# Patient Record
Sex: FEMALE | Race: BLACK OR AFRICAN AMERICAN | NOT HISPANIC OR LATINO | URBAN - METROPOLITAN AREA
[De-identification: names, ages, dates, MRNs, and addresses within clinical notes are randomized per-mention and may not be internally consistent; named-entity substitution may affect disease eponyms.]

---

## 2024-03-26 ENCOUNTER — INPATIENT (INPATIENT)
Facility: HOSPITAL | Age: 64
LOS: 2 days | Discharge: HOME | End: 2024-03-29
Attending: ORAL & MAXILLOFACIAL SURGERY | Admitting: ORAL & MAXILLOFACIAL SURGERY
Payer: COMMERCIAL

## 2024-03-26 VITALS
SYSTOLIC BLOOD PRESSURE: 192 MMHG | WEIGHT: 227.08 LBS | HEART RATE: 112 BPM | TEMPERATURE: 100 F | DIASTOLIC BLOOD PRESSURE: 129 MMHG | RESPIRATION RATE: 16 BRPM

## 2024-03-26 DIAGNOSIS — K12.2 CELLULITIS AND ABSCESS OF MOUTH: ICD-10-CM

## 2024-03-26 LAB
ALBUMIN SERPL ELPH-MCNC: 4.6 G/DL — SIGNIFICANT CHANGE UP (ref 3.3–5)
ALP SERPL-CCNC: 82 U/L — SIGNIFICANT CHANGE UP (ref 40–120)
ALT FLD-CCNC: 17 U/L — SIGNIFICANT CHANGE UP (ref 4–33)
ANION GAP SERPL CALC-SCNC: 17 MMOL/L — HIGH (ref 7–14)
APTT BLD: 34.2 SEC — SIGNIFICANT CHANGE UP (ref 24.5–35.6)
AST SERPL-CCNC: 23 U/L — SIGNIFICANT CHANGE UP (ref 4–32)
BASE EXCESS BLDV CALC-SCNC: 2.4 MMOL/L — SIGNIFICANT CHANGE UP (ref -2–3)
BASOPHILS # BLD AUTO: 0.07 K/UL — SIGNIFICANT CHANGE UP (ref 0–0.2)
BASOPHILS NFR BLD AUTO: 0.3 % — SIGNIFICANT CHANGE UP (ref 0–2)
BILIRUB SERPL-MCNC: 1.8 MG/DL — HIGH (ref 0.2–1.2)
BLD GP AB SCN SERPL QL: NEGATIVE — SIGNIFICANT CHANGE UP
BLOOD GAS VENOUS COMPREHENSIVE RESULT: SIGNIFICANT CHANGE UP
BUN SERPL-MCNC: 12 MG/DL — SIGNIFICANT CHANGE UP (ref 7–23)
CALCIUM SERPL-MCNC: 10.3 MG/DL — SIGNIFICANT CHANGE UP (ref 8.4–10.5)
CHLORIDE BLDV-SCNC: 103 MMOL/L — SIGNIFICANT CHANGE UP (ref 96–108)
CHLORIDE SERPL-SCNC: 98 MMOL/L — SIGNIFICANT CHANGE UP (ref 98–107)
CO2 BLDV-SCNC: 27.6 MMOL/L — HIGH (ref 22–26)
CO2 SERPL-SCNC: 23 MMOL/L — SIGNIFICANT CHANGE UP (ref 22–31)
CREAT SERPL-MCNC: 0.93 MG/DL — SIGNIFICANT CHANGE UP (ref 0.5–1.3)
EGFR: 69 ML/MIN/1.73M2 — SIGNIFICANT CHANGE UP
EOSINOPHIL # BLD AUTO: 0.02 K/UL — SIGNIFICANT CHANGE UP (ref 0–0.5)
EOSINOPHIL NFR BLD AUTO: 0.1 % — SIGNIFICANT CHANGE UP (ref 0–6)
FLUAV AG NPH QL: SIGNIFICANT CHANGE UP
FLUBV AG NPH QL: SIGNIFICANT CHANGE UP
GAS PNL BLDV: 135 MMOL/L — LOW (ref 136–145)
GAS PNL BLDV: SIGNIFICANT CHANGE UP
GLUCOSE BLDV-MCNC: 182 MG/DL — HIGH (ref 70–99)
GLUCOSE SERPL-MCNC: 172 MG/DL — HIGH (ref 70–99)
HCO3 BLDV-SCNC: 26 MMOL/L — SIGNIFICANT CHANGE UP (ref 22–29)
HCT VFR BLD CALC: 42.6 % — SIGNIFICANT CHANGE UP (ref 34.5–45)
HCT VFR BLDA CALC: 43 % — SIGNIFICANT CHANGE UP (ref 34.5–46.5)
HGB BLD CALC-MCNC: 14.4 G/DL — SIGNIFICANT CHANGE UP (ref 11.7–16.1)
HGB BLD-MCNC: 14.6 G/DL — SIGNIFICANT CHANGE UP (ref 11.5–15.5)
IANC: 19.79 K/UL — HIGH (ref 1.8–7.4)
IMM GRANULOCYTES NFR BLD AUTO: 1 % — HIGH (ref 0–0.9)
INR BLD: 1.12 RATIO — SIGNIFICANT CHANGE UP (ref 0.85–1.18)
LACTATE BLDV-MCNC: 1.1 MMOL/L — SIGNIFICANT CHANGE UP (ref 0.5–2)
LYMPHOCYTES # BLD AUTO: 1.85 K/UL — SIGNIFICANT CHANGE UP (ref 1–3.3)
LYMPHOCYTES # BLD AUTO: 7.7 % — LOW (ref 13–44)
MCHC RBC-ENTMCNC: 29.5 PG — SIGNIFICANT CHANGE UP (ref 27–34)
MCHC RBC-ENTMCNC: 34.3 GM/DL — SIGNIFICANT CHANGE UP (ref 32–36)
MCV RBC AUTO: 86.1 FL — SIGNIFICANT CHANGE UP (ref 80–100)
MONOCYTES # BLD AUTO: 1.96 K/UL — HIGH (ref 0–0.9)
MONOCYTES NFR BLD AUTO: 8.2 % — SIGNIFICANT CHANGE UP (ref 2–14)
NEUTROPHILS # BLD AUTO: 19.79 K/UL — HIGH (ref 1.8–7.4)
NEUTROPHILS NFR BLD AUTO: 82.7 % — HIGH (ref 43–77)
NRBC # BLD: 0 /100 WBCS — SIGNIFICANT CHANGE UP (ref 0–0)
NRBC # FLD: 0 K/UL — SIGNIFICANT CHANGE UP (ref 0–0)
PCO2 BLDV: 38 MMHG — LOW (ref 39–52)
PH BLDV: 7.45 — HIGH (ref 7.32–7.43)
PLATELET # BLD AUTO: 464 K/UL — HIGH (ref 150–400)
PO2 BLDV: 37 MMHG — SIGNIFICANT CHANGE UP (ref 25–45)
POTASSIUM BLDV-SCNC: 2.9 MMOL/L — CRITICAL LOW (ref 3.5–5.1)
POTASSIUM SERPL-MCNC: 3.4 MMOL/L — LOW (ref 3.5–5.3)
POTASSIUM SERPL-SCNC: 3.4 MMOL/L — LOW (ref 3.5–5.3)
PROT SERPL-MCNC: 9.2 G/DL — HIGH (ref 6–8.3)
PROTHROM AB SERPL-ACNC: 12.5 SEC — SIGNIFICANT CHANGE UP (ref 9.5–13)
RBC # BLD: 4.95 M/UL — SIGNIFICANT CHANGE UP (ref 3.8–5.2)
RBC # FLD: 14.1 % — SIGNIFICANT CHANGE UP (ref 10.3–14.5)
RH IG SCN BLD-IMP: POSITIVE — SIGNIFICANT CHANGE UP
RSV RNA NPH QL NAA+NON-PROBE: SIGNIFICANT CHANGE UP
SAO2 % BLDV: 59.3 % — LOW (ref 67–88)
SARS-COV-2 RNA SPEC QL NAA+PROBE: SIGNIFICANT CHANGE UP
SODIUM SERPL-SCNC: 138 MMOL/L — SIGNIFICANT CHANGE UP (ref 135–145)
WBC # BLD: 23.93 K/UL — HIGH (ref 3.8–10.5)
WBC # FLD AUTO: 23.93 K/UL — HIGH (ref 3.8–10.5)

## 2024-03-26 PROCEDURE — 70491 CT SOFT TISSUE NECK W/DYE: CPT | Mod: 26

## 2024-03-26 PROCEDURE — 99285 EMERGENCY DEPT VISIT HI MDM: CPT

## 2024-03-26 RX ORDER — GLUCAGON INJECTION, SOLUTION 0.5 MG/.1ML
1 INJECTION, SOLUTION SUBCUTANEOUS ONCE
Refills: 0 | Status: DISCONTINUED | OUTPATIENT
Start: 2024-03-26 | End: 2024-03-27

## 2024-03-26 RX ORDER — OXYCODONE HYDROCHLORIDE 5 MG/1
10 TABLET ORAL EVERY 6 HOURS
Refills: 0 | Status: DISCONTINUED | OUTPATIENT
Start: 2024-03-26 | End: 2024-03-26

## 2024-03-26 RX ORDER — DEXAMETHASONE 0.5 MG/5ML
10 ELIXIR ORAL ONCE
Refills: 0 | Status: COMPLETED | OUTPATIENT
Start: 2024-03-26 | End: 2024-03-26

## 2024-03-26 RX ORDER — SODIUM CHLORIDE 9 MG/ML
1000 INJECTION, SOLUTION INTRAVENOUS
Refills: 0 | Status: DISCONTINUED | OUTPATIENT
Start: 2024-03-26 | End: 2024-03-27

## 2024-03-26 RX ORDER — ACETAMINOPHEN 500 MG
1000 TABLET ORAL ONCE
Refills: 0 | Status: COMPLETED | OUTPATIENT
Start: 2024-03-26 | End: 2024-03-26

## 2024-03-26 RX ORDER — METOCLOPRAMIDE HCL 10 MG
10 TABLET ORAL ONCE
Refills: 0 | Status: DISCONTINUED | OUTPATIENT
Start: 2024-03-26 | End: 2024-03-29

## 2024-03-26 RX ORDER — AMPICILLIN SODIUM AND SULBACTAM SODIUM 250; 125 MG/ML; MG/ML
3 INJECTION, POWDER, FOR SUSPENSION INTRAMUSCULAR; INTRAVENOUS EVERY 6 HOURS
Refills: 0 | Status: DISCONTINUED | OUTPATIENT
Start: 2024-03-27 | End: 2024-03-29

## 2024-03-26 RX ORDER — AMPICILLIN SODIUM AND SULBACTAM SODIUM 250; 125 MG/ML; MG/ML
3 INJECTION, POWDER, FOR SUSPENSION INTRAMUSCULAR; INTRAVENOUS ONCE
Refills: 0 | Status: COMPLETED | OUTPATIENT
Start: 2024-03-26 | End: 2024-03-26

## 2024-03-26 RX ORDER — OXYCODONE HYDROCHLORIDE 5 MG/1
10 TABLET ORAL EVERY 4 HOURS
Refills: 0 | Status: DISCONTINUED | OUTPATIENT
Start: 2024-03-26 | End: 2024-03-29

## 2024-03-26 RX ORDER — SODIUM CHLORIDE 9 MG/ML
1000 INJECTION, SOLUTION INTRAVENOUS
Refills: 0 | Status: DISCONTINUED | OUTPATIENT
Start: 2024-03-26 | End: 2024-03-29

## 2024-03-26 RX ORDER — DEXTROSE 50 % IN WATER 50 %
25 SYRINGE (ML) INTRAVENOUS ONCE
Refills: 0 | Status: DISCONTINUED | OUTPATIENT
Start: 2024-03-26 | End: 2024-03-27

## 2024-03-26 RX ORDER — OXYCODONE HYDROCHLORIDE 5 MG/1
5 TABLET ORAL EVERY 4 HOURS
Refills: 0 | Status: DISCONTINUED | OUTPATIENT
Start: 2024-03-26 | End: 2024-03-29

## 2024-03-26 RX ORDER — AMLODIPINE BESYLATE 2.5 MG/1
5 TABLET ORAL AT BEDTIME
Refills: 0 | Status: DISCONTINUED | OUTPATIENT
Start: 2024-03-26 | End: 2024-03-27

## 2024-03-26 RX ORDER — INSULIN LISPRO 100/ML
VIAL (ML) SUBCUTANEOUS
Refills: 0 | Status: DISCONTINUED | OUTPATIENT
Start: 2024-03-26 | End: 2024-03-27

## 2024-03-26 RX ORDER — MORPHINE SULFATE 50 MG/1
2 CAPSULE, EXTENDED RELEASE ORAL ONCE
Refills: 0 | Status: DISCONTINUED | OUTPATIENT
Start: 2024-03-26 | End: 2024-03-26

## 2024-03-26 RX ORDER — CHLORHEXIDINE GLUCONATE 213 G/1000ML
15 SOLUTION TOPICAL
Refills: 0 | Status: DISCONTINUED | OUTPATIENT
Start: 2024-03-26 | End: 2024-03-29

## 2024-03-26 RX ORDER — OXYCODONE HYDROCHLORIDE 5 MG/1
5 TABLET ORAL EVERY 6 HOURS
Refills: 0 | Status: DISCONTINUED | OUTPATIENT
Start: 2024-03-26 | End: 2024-03-26

## 2024-03-26 RX ORDER — VANCOMYCIN HCL 1 G
1000 VIAL (EA) INTRAVENOUS ONCE
Refills: 0 | Status: COMPLETED | OUTPATIENT
Start: 2024-03-26 | End: 2024-03-26

## 2024-03-26 RX ORDER — IBUPROFEN 200 MG
600 TABLET ORAL EVERY 6 HOURS
Refills: 0 | Status: DISCONTINUED | OUTPATIENT
Start: 2024-03-26 | End: 2024-03-29

## 2024-03-26 RX ORDER — AMPICILLIN SODIUM AND SULBACTAM SODIUM 250; 125 MG/ML; MG/ML
INJECTION, POWDER, FOR SUSPENSION INTRAMUSCULAR; INTRAVENOUS
Refills: 0 | Status: DISCONTINUED | OUTPATIENT
Start: 2024-03-26 | End: 2024-03-29

## 2024-03-26 RX ORDER — PIPERACILLIN AND TAZOBACTAM 4; .5 G/20ML; G/20ML
3.38 INJECTION, POWDER, LYOPHILIZED, FOR SOLUTION INTRAVENOUS ONCE
Refills: 0 | Status: COMPLETED | OUTPATIENT
Start: 2024-03-26 | End: 2024-03-26

## 2024-03-26 RX ORDER — ONDANSETRON 8 MG/1
4 TABLET, FILM COATED ORAL EVERY 8 HOURS
Refills: 0 | Status: DISCONTINUED | OUTPATIENT
Start: 2024-03-26 | End: 2024-03-29

## 2024-03-26 RX ORDER — SODIUM CHLORIDE 9 MG/ML
2000 INJECTION INTRAMUSCULAR; INTRAVENOUS; SUBCUTANEOUS ONCE
Refills: 0 | Status: COMPLETED | OUTPATIENT
Start: 2024-03-26 | End: 2024-03-26

## 2024-03-26 RX ORDER — DEXTROSE 50 % IN WATER 50 %
15 SYRINGE (ML) INTRAVENOUS ONCE
Refills: 0 | Status: DISCONTINUED | OUTPATIENT
Start: 2024-03-26 | End: 2024-03-27

## 2024-03-26 RX ORDER — ACETAMINOPHEN 500 MG
650 TABLET ORAL EVERY 6 HOURS
Refills: 0 | Status: DISCONTINUED | OUTPATIENT
Start: 2024-03-26 | End: 2024-03-29

## 2024-03-26 RX ORDER — DEXTROSE 50 % IN WATER 50 %
12.5 SYRINGE (ML) INTRAVENOUS ONCE
Refills: 0 | Status: DISCONTINUED | OUTPATIENT
Start: 2024-03-26 | End: 2024-03-27

## 2024-03-26 RX ADMIN — Medication 102 MILLIGRAM(S): at 16:10

## 2024-03-26 RX ADMIN — Medication 250 MILLIGRAM(S): at 16:10

## 2024-03-26 RX ADMIN — AMLODIPINE BESYLATE 5 MILLIGRAM(S): 2.5 TABLET ORAL at 21:50

## 2024-03-26 RX ADMIN — MORPHINE SULFATE 2 MILLIGRAM(S): 50 CAPSULE, EXTENDED RELEASE ORAL at 17:38

## 2024-03-26 RX ADMIN — AMPICILLIN SODIUM AND SULBACTAM SODIUM 200 GRAM(S): 250; 125 INJECTION, POWDER, FOR SUSPENSION INTRAMUSCULAR; INTRAVENOUS at 18:39

## 2024-03-26 RX ADMIN — Medication 600 MILLIGRAM(S): at 23:20

## 2024-03-26 RX ADMIN — CHLORHEXIDINE GLUCONATE 15 MILLILITER(S): 213 SOLUTION TOPICAL at 18:39

## 2024-03-26 RX ADMIN — SODIUM CHLORIDE 100 MILLILITER(S): 9 INJECTION, SOLUTION INTRAVENOUS at 21:50

## 2024-03-26 RX ADMIN — Medication 400 MILLIGRAM(S): at 14:59

## 2024-03-26 RX ADMIN — Medication 600 MILLIGRAM(S): at 18:39

## 2024-03-26 RX ADMIN — Medication 650 MILLIGRAM(S): at 18:39

## 2024-03-26 RX ADMIN — SODIUM CHLORIDE 2000 MILLILITER(S): 9 INJECTION INTRAMUSCULAR; INTRAVENOUS; SUBCUTANEOUS at 14:59

## 2024-03-26 RX ADMIN — AMPICILLIN SODIUM AND SULBACTAM SODIUM 200 GRAM(S): 250; 125 INJECTION, POWDER, FOR SUSPENSION INTRAMUSCULAR; INTRAVENOUS at 23:21

## 2024-03-26 RX ADMIN — Medication 125 MILLIGRAM(S): at 15:19

## 2024-03-26 RX ADMIN — PIPERACILLIN AND TAZOBACTAM 200 GRAM(S): 4; .5 INJECTION, POWDER, LYOPHILIZED, FOR SOLUTION INTRAVENOUS at 14:59

## 2024-03-26 NOTE — ED ADULT TRIAGE NOTE - CHIEF COMPLAINT QUOTE
Pt c/o right lower dental pain with mouth swelling to face and tongue.  Pt able to speak in full sentences.  Pt states the dentist told her she has an abscess and it needs to be drained in the hospital.   Hx: HTN, boarderline DM, RA

## 2024-03-26 NOTE — ED PROVIDER NOTE - CLINICAL SUMMARY MEDICAL DECISION MAKING FREE TEXT BOX
63F with h/o HTN, DM (not on meds), RA, presents with <24 of b/l facial swelling sent in by dentist/OMFS for c/f ludwigs angina. AVSS. +b/l submental/submandibular/sublingual swelling and tenderness to palpation, +poor dentition with cavities, +edema of upper midline neck and floor of mouth with raised tongue, unable to visualize post. oropharynx or uvula 2/2 swelling. C/f Ludwigs angina given physical exam findings and rate of spread of inf, possible dental abscess with assoc cellulitis, less likely allergic reaction vs angioedema. Will get labs, blood cultures, CT face/neck with iv contrast, give ivf, analgesia, ABX, steroids, and ENT consult and will reassess.

## 2024-03-26 NOTE — H&P ADULT - HISTORY OF PRESENT ILLNESS
HPI:  63F w/ PMH of HTN, DM, presents to [facility] ED w/ 1 day history of R sided facial swelling. Pt reports that swelling has worsened significantly in last 12 hours. Pt went to dentist who told pt to come to ED for evaluation. Pt w/ significant difficulty breathing and swallowing. Pt is also reporting dental pain associated w/ teeth 30 and 31. WBC on arrival was 24, scoped by ENT who noted significant inflammation and edema of arytenoids and recommended emergent intubation for airway protection.

## 2024-03-26 NOTE — ED PROVIDER NOTE - NSICDXPASTMEDICALHX_GEN_ALL_CORE_FT
PAST MEDICAL HISTORY:  DM (diabetes mellitus) not on medication, managed with lifestyle modifications (3/24)    HTN (hypertension)     Rheumatoid arthritis

## 2024-03-26 NOTE — CONSULT NOTE ADULT - SUBJECTIVE AND OBJECTIVE BOX
OMFS Consult Note:    BLAIR ERICKSON  MRN-[id number]  [facility]    HPI:  63F w/ PMH of HTN, DM, presents to [facility] ED w/ 1 day history of R sided facial swelling. Pt reports that swelling has worsened significantly in last 12 hours. Pt went to dentist who told pt to come to ED for evaluation. Pt w/ significant difficulty breathing and swallowing. Pt is also reporting dental pain associated w/ teeth 30 and 31. WBC on arrival 24, scoped by ENT who noted significant inflammation and edema of arytenoids and recommended emergent intubation for airway protection.     PAST MEDICAL & SURGICAL HISTORY:  HTN (hypertension)    Rheumatoid arthritis    DM (diabetes mellitus)  not on medication, managed with lifestyle modifications (3/24)    Allergies    No Known Allergies    Intolerances        Social History: Denies ETOH use, Tobacco, drugs    Review of systems:  Denies fever, chills. denies LOC. denies nausea/vomiting/headache. denies CP, SOB, cough. Denies palpatitions. denies blurred/double vision. denies dyspahgia, dyspnea.      T(F): 99.9 (03-26-24 @ 13:23), Max: 99.9 (03-26-24 @ 13:23)  HR: 96 (03-26-24 @ 15:11) (96 - 112)  BP: 171/97 (03-26-24 @ 15:11) (166/111 - 192/129)  RR: 16 (03-26-24 @ 15:11) (16 - 16)  SpO2: 100% (03-26-24 @ 15:11) (100% - 100%)      Labs:                          14.6   23.93 )-----------( 464      ( 26 Mar 2024 15:11 )             42.6     03-26    138  |  98  |  12  ----------------------------<  172<H>  3.4<L>   |  23  |  0.93    Ca    10.3      26 Mar 2024 15:11    TPro  9.2<H>  /  Alb  4.6  /  TBili  1.8<H>  /  DBili  x   /  AST  23  /  ALT  17  /  AlkPhos  82  03-26    Urinalysis Basic - ( 26 Mar 2024 15:11 )    Color: x / Appearance: x / SG: x / pH: x  Gluc: 172 mg/dL / Ketone: x  / Bili: x / Urobili: x   Blood: x / Protein: x / Nitrite: x   Leuk Esterase: x / RBC: x / WBC x   Sq Epi: x / Non Sq Epi: x / Bacteria: x      PT/INR - ( 26 Mar 2024 15:11 )   PT: 12.5 sec;   INR: 1.12 ratio      PTT - ( 26 Mar 2024 15:11 )  PTT:34.2 sec      PHYSICAL EXAM:  Gen: AAox3, NAD  Face: R submandibular/neck swelling and erythema. Swelling is firm and tender to palpation. No trismus or lymphadenopathy. Difficulty palpating inferior border of mandible on R side.   Oral: Carious teeth #30-32. No purulence evident intraorally. Pain to percussion on #31. Dysphagia and odynophagia. Significant elevation of FOM.       IMAGING:   CT MAXILLOFACIAL PENDING        OMFS Consult Note:    BLAIR ERICKSON  MRN-[id number]  [facility]    HPI:  63F w/ PMH of HTN, DM, presents to [facility] ED w/ 1 day history of R sided facial swelling. Pt reports that swelling has worsened significantly in last 12 hours. Pt went to dentist who told pt to come to ED for evaluation. Pt w/ significant difficulty breathing and swallowing. Pt is also reporting dental pain associated w/ teeth 30 and 31. WBC on arrival was 24, scoped by ENT who noted significant inflammation and edema of arytenoids and recommended emergent intubation for airway protection.     PAST MEDICAL & SURGICAL HISTORY:  HTN (hypertension)    Rheumatoid arthritis    DM (diabetes mellitus)  not on medication, managed with lifestyle modifications (3/24)    Allergies    No Known Allergies    Intolerances        Social History: Denies ETOH use, Tobacco, drugs    Review of systems:  Denies fever, chills. denies LOC. denies nausea/vomiting/headache. denies CP, SOB, cough. Denies palpatitions. denies blurred/double vision. denies dyspahgia, dyspnea.      T(F): 99.9 (03-26-24 @ 13:23), Max: 99.9 (03-26-24 @ 13:23)  HR: 96 (03-26-24 @ 15:11) (96 - 112)  BP: 171/97 (03-26-24 @ 15:11) (166/111 - 192/129)  RR: 16 (03-26-24 @ 15:11) (16 - 16)  SpO2: 100% (03-26-24 @ 15:11) (100% - 100%)      Labs:                          14.6   23.93 )-----------( 464      ( 26 Mar 2024 15:11 )             42.6     03-26    138  |  98  |  12  ----------------------------<  172<H>  3.4<L>   |  23  |  0.93    Ca    10.3      26 Mar 2024 15:11    TPro  9.2<H>  /  Alb  4.6  /  TBili  1.8<H>  /  DBili  x   /  AST  23  /  ALT  17  /  AlkPhos  82  03-26    Urinalysis Basic - ( 26 Mar 2024 15:11 )    Color: x / Appearance: x / SG: x / pH: x  Gluc: 172 mg/dL / Ketone: x  / Bili: x / Urobili: x   Blood: x / Protein: x / Nitrite: x   Leuk Esterase: x / RBC: x / WBC x   Sq Epi: x / Non Sq Epi: x / Bacteria: x      PT/INR - ( 26 Mar 2024 15:11 )   PT: 12.5 sec;   INR: 1.12 ratio      PTT - ( 26 Mar 2024 15:11 )  PTT:34.2 sec      PHYSICAL EXAM:  Gen: AAox3, NAD  Face: R submandibular/neck swelling and erythema. Swelling is firm and tender to palpation. No trismus or lymphadenopathy. Difficulty palpating inferior border of mandible on R side.   Oral: Carious teeth #30-32. No purulence evident intraorally. Pain to percussion on #31. Dysphagia and odynophagia. Significant elevation of FOM.       IMAGING:   CT MAXILLOFACIAL PENDING

## 2024-03-26 NOTE — CONSULT NOTE ADULT - ASSESSMENT
63F w/ PMH of HTN, DM, presents to [facility] ED w/ 1 day history of R sided facial swelling. Pt reports that swelling has worsened significantly in last 12 hours. Pt went to dentist who told pt to come to ED for evaluation. Pt w/ significant difficulty breathing and swallowing. Pt is also reporting dental pain associated w/ teeth 30 and 31. WBC on arrival was 24, scoped by ENT who noted significant inflammation and edema of arytenoids and recommended emergent intubation for airway protection. SICU was previously consulted for airway watch post op; however patient is not going to the OR and the primary team feels comfortable with the patient on a  floor given clinical improvement and no signs of acute or impending respiratory distress. ENT also agrees patient is clinically stable and does not need SICU at this time.  We recommend continuous pulse ox, steroids and antibiotics. Please feel free to reconsult us at any time.

## 2024-03-26 NOTE — H&P ADULT - NSHPPHYSICALEXAM_GEN_ALL_CORE
PHYSICAL EXAM:  Gen: AAox3, NAD  Face: R submandibular/neck swelling and erythema. Swelling is firm and tender to palpation. No trismus or lymphadenopathy. Difficulty palpating inferior border of mandible on R side.   Oral: Carious teeth #30-32. No purulence evident intraorally. Pain to percussion on #31. Dysphagia and odynophagia. Significant elevation of R FOM, extreme tenderness to palpation

## 2024-03-26 NOTE — ED ADULT NURSE NOTE - NSFALLUNIVINTERV_ED_ALL_ED
Bed/Stretcher in lowest position, wheels locked, appropriate side rails in place/Call bell, personal items and telephone in reach/Instruct patient to call for assistance before getting out of bed/chair/stretcher/Non-slip footwear applied when patient is off stretcher/[place] to call system/Physically safe environment - no spills, clutter or unnecessary equipment/Purposeful proactive rounding/Room/bathroom lighting operational, light cord in reach

## 2024-03-26 NOTE — ED ADULT NURSE REASSESSMENT NOTE - NS ED NURSE REASSESS COMMENT FT1
facilitator RN - Pt transported to CT with EDT and attending and resident. Pt tolerated CT well. Brought back to Tr. B, placed on cardiac monitor, O2 sat on room air 100%. Suctioned set up if needed. Resp even and unlabored. Pt educated on plan of care. Ongoing eval in progress.

## 2024-03-26 NOTE — ED PROVIDER NOTE - OBJECTIVE STATEMENT
63F with a h/o HTN, RA, DM (not on medication), presents from outpatient dentist/oral surgeon with c/f ludwigs angina vs cellulitis with abscess. Pt notes onset of facial swelling and pain starting yesterday afternoon which has rapidly progressed to day. Swelling/pain located under her jaw b/l extending to her neck and b/l cheeks and is also endorsing some tongue swelling. Pt went to see dentist/oral surgeon who advised her to come to ED immediately for possible OR intervention. Pt with poor dentition with h/o cavities.  Denies fever/chills, SOB, drooling, CP, abd pain, N/V/D, itchiness, rash.

## 2024-03-26 NOTE — CONSULT NOTE ADULT - ASSESSMENT
A/P:  63F w/ PMH of HTN, DM, presents to [facility] ED w/ 1 day history of R sided facial swelling. Pt in need of emergent airway protection w/ incision and drainage for R neck swelling.     Plan:  [place] [facility]  [place]acial   -Pt to be added on for OR this evening  -IV Abx  -Multimodal pain control       Kojo Franco DMD  Oral and Maxillofacial Surgery  [facility] Pager [phone]  Available on Microsoft Teams A/P:  63F w/ PMH of HTN, DM, presents to [facility] ED w/ 1 day history of R sided facial swelling. Pt in need of airway protection w/ incision and drainage for R neck swelling.     Plan:  -Admit to [facility] [place]  -CT Maxillofacial   -Pt to be added on for OR this evening: Incision and drainage of R neck swelling, extraction of indicated teeth, intubation   -IV Abx  -Multimodal pain control       Kojo Franco DMD  Oral and Maxillofacial Surgery  [facility] Pager [phone]  Available on Microsoft Teams

## 2024-03-26 NOTE — ED PROVIDER NOTE - PROGRESS NOTE DETAILS
Vishal Hernandez MD (PGY-3) ENT consultant aware. strong suspicion for Haris's angina communicated. ENT evaluated, concern that patient should be intubated urgently in OR, OMFS consulted and at bedside. Patient remains stable without any new or evolving symptoms. Vishal Hernandez MD (PGY-3) omfs evaluated the pt. we worked with them to organize rapid admission to sicu so that the pt could be moved to the or for controlled setting intubation. we planned to go from ct to or. while in the ct scanner, the omfs resident made us aware that the or where the patient was to go was being used for another case and we could not go up yet. we returned to the er bay. will closely monitor. intubation equipment at bedside in case of decompensation. NATE:  Patient initially admitted to SICU as she was planned to go to the operating room for I&D, tooth resection, intubation.  OMFS states that patient will likely no longer be going to the OR emergently.  She has remained with swollen submandibular area but with stable airway, phonating, no stridor, and overall comfortable appearing.  Patient tolerated lying supine for CT scan.  OMFS and SICU states that patient status should be changed to floor with continuous pulse ox.  Will change order in EMR.

## 2024-03-26 NOTE — ED ADULT NURSE NOTE - OBJECTIVE STATEMENT
Break shift RN: Pt received to TrB presents with rt sided facial/neck and tongue swelling, with tongue swelling beginning last night. Pt a&xo4, ambulatory at baseline, swelling on rt side of neck pt endorsing difficulty swallowing however able to speak in full sentences. NSR on CM, SpO2 on %. Pt respirations even and unlabored, abd soft and non-distended, nontender to palpation. 20g IV placed in rt arm, labs drawn and sent, pt medicated as per ordered, will continue to monitor.

## 2024-03-26 NOTE — CONSULT NOTE ADULT - SUBJECTIVE AND OBJECTIVE BOX
SICU Consultation Note  =====================================================  HPI:   63F w/ PMH of HTN, DM, presents to [facility] ED w/ 1 day history of R sided facial swelling. Pt reports that swelling has worsened significantly in last 12 hours. Pt went to dentist who told pt to come to ED for evaluation. Pt w/ significant difficulty breathing and swallowing. Pt is also reporting dental pain associated w/ teeth 30 and 31. WBC on arrival was 24, scoped by ENT who noted significant inflammation and edema of arytenoids and recommended emergent intubation for airway protection. SICU was previously consulted for airway watch post op; however patient is not going to the OR and the primary team feels comfortable with the patient on a  floor given clinical improvement and no signs of acute or impending respiratory distress. ENT also agrees patient is clinically stable and does not need SICU at this time.       PAST MEDICAL & SURGICAL HISTORY:  HTN (hypertension)      Rheumatoid arthritis      DM (diabetes mellitus)  not on medication, managed with lifestyle modifications (3/24)        Home Meds: Home Medications:    Allergies: Allergies    No Known Allergies    Intolerances      Soc:   Advanced Directives: Presumed Full Code     ROS:    REVIEW OF SYSTEMS    [ ] A ten-point review of systems was otherwise negative except as noted.  [ ] Due to altered mental status/intubation, subjective information were not able to be obtained from the patient. History was obtained, to the extent possible, from review of the chart and collateral sources of information.      CURRENT MEDICATIONS:   --------------------------------------------------------------------------------------  Neurologic Medications  acetaminophen   Oral Liquid .. 650 milliGRAM(s) Oral every 6 hours  ibuprofen  Suspension. 600 milliGRAM(s) Oral every 6 hours  metoclopramide Injectable 10 milliGRAM(s) IV Push once PRN only zofran ineffective  ondansetron Injectable 4 milliGRAM(s) IV Push every 8 hours PRN Nausea and/or Vomiting  oxyCODONE    Solution 5 milliGRAM(s) Oral every 4 hours PRN Moderate Pain (4 - 6)  oxyCODONE    Solution 10 milliGRAM(s) Oral every 4 hours PRN Severe Pain (7 - 10)    Respiratory Medications    Cardiovascular Medications  amLODIPine   Tablet 5 milliGRAM(s) Oral at bedtime    Gastrointestinal Medications  dextrose 5%. 1000 milliLiter(s) IV Continuous <Continuous>  dextrose 5%. 1000 milliLiter(s) IV Continuous <Continuous>  lactated ringers. 1000 milliLiter(s) IV Continuous <Continuous>    Genitourinary Medications    Hematologic/Oncologic Medications    Antimicrobial/Immunologic Medications  ampicillin/sulbactam  IVPB        Endocrine/Metabolic Medications  dextrose 50% Injectable 25 Gram(s) IV Push once  dextrose 50% Injectable 12.5 Gram(s) IV Push once  dextrose 50% Injectable 25 Gram(s) IV Push once  dextrose Oral Gel 15 Gram(s) Oral once PRN Blood Glucose LESS THAN 70 milliGRAM(s)/deciliter  glucagon  Injectable 1 milliGRAM(s) IntraMuscular once  insulin lispro (ADMELOG) corrective regimen sliding scale   SubCutaneous three times a day before meals    Topical/Other Medications  chlorhexidine 0.12% Liquid 15 milliLiter(s) Swish and Spit two times a day    --------------------------------------------------------------------------------------    VITAL SIGNS, INS/OUTS (last 24 hours):  --------------------------------------------------------------------------------------  ICU Vital Signs Last 24 Hrs  T(C): 37.7 (26 Mar 2024 13:23), Max: 37.7 (26 Mar 2024 13:23)  T(F): 99.9 (26 Mar 2024 13:23), Max: 99.9 (26 Mar 2024 13:23)  HR: 96 (26 Mar 2024 15:11) (96 - 112)  BP: 171/97 (26 Mar 2024 15:11) (166/111 - 192/129)  BP(mean): --  ABP: --  ABP(mean): --  RR: 16 (26 Mar 2024 15:11) (16 - 16)  SpO2: 100% (26 Mar 2024 15:11) (100% - 100%)    O2 Parameters below as of 26 Mar 2024 15:11  Patient On (Oxygen Delivery Method): nasal cannula          I&O's Summary    --------------------------------------------------------------------------------------    EXAM:  General/Neuro  Exam: Normal, NAD, alert, oriented x 3, no focal deficits. PERRLA     Respiratory  Exam: Lungs clear to auscultation, Normal expansion/effort. on RA. no stridor, dyspnea, tachypnea and tolerating her own secretions.    Cardiovascular  Exam: S1, S2.  Regular rate and rhythm.   Cardiac Rhythm: Normal Sinus Rhythm    GI  Exam: Abdomen soft, Non-tender, Non-distended.      Extremities  Exam: Extremities warm, pink, well-perfused.        Tubes/Lines/Drains  ***  [x] Peripheral IV  [] Central Venous Line     	[] R	[] L	[] IJ	[] Fem	[] SC        Type:	    Date Placed:   [] Arterial Line		[] R	[] L	[] Fem	[] Rad	[] Ax	Date Placed:   [] PICC:         	[] Midline		[] Mediport           [] Urinary Catheter		Date Placed:     LABS  --------------------------------------------------------------------------------------  Labs:  CAPILLARY BLOOD GLUCOSE      POCT Blood Glucose.: 179 mg/dL (26 Mar 2024 13:27)                          14.6   23.93 )-----------( 464      ( 26 Mar 2024 15:11 )             42.6       Auto Neutrophil %: 82.7 % (03-26-24 @ 15:11)  Auto Immature Granulocyte %: 1.0 % (03-26-24 @ 15:11)    03-26    138  |  98  |  12  ----------------------------<  172<H>  3.4<L>   |  23  |  0.93      Calcium: 10.3 mg/dL (03-26-24 @ 15:11)      LFTs:             9.2  | 1.8  | 23       ------------------[82      ( 26 Mar 2024 15:11 )  4.6  | x    | 17          Lipase:x      Amylase:x         Blood Gas Venous - Lactate: 1.1 mmol/L (03-26-24 @ 14:50)      Coags:     12.5   ----< 1.12    ( 26 Mar 2024 15:11 )     34.2                Urinalysis Basic - ( 26 Mar 2024 15:11 )    Color: x / Appearance: x / SG: x / pH: x  Gluc: 172 mg/dL / Ketone: x  / Bili: x / Urobili: x   Blood: x / Protein: x / Nitrite: x   Leuk Esterase: x / RBC: x / WBC x   Sq Epi: x / Non Sq Epi: x / Bacteria: x          --------------------------------------------------------------------------------------    OTHER LABS    IMAGING RESULTS

## 2024-03-26 NOTE — ED PROVIDER NOTE - PHYSICAL EXAMINATION
GENERAL: +obvious b/l facial swelling  HEENT: normal conjunctiva, oral mucosa moist, +b/l submental/submandibular swelling extending to upper neck and to b/l lower cheeks associated with ttp, +tongue with mild swelling and elevated due to base of mouth swelling, unable to visualize posterior oropharynx or uvula 2/2 selling, +tender cervical LAD  CARDIAC: regular rate and rhythm, normal S1S2  PULM: normal breath sounds, clear to ascultation bilaterally, no rales, rhonchi, wheezing  GI: Abd soft, nondistended, nontender, no rebound tenderness, no guarding, no rigidity  SKIN: no visible rashes, no erythema

## 2024-03-26 NOTE — ED PROVIDER NOTE - ATTENDING CONTRIBUTION TO CARE
GEN - NAD;ao3, answering questions appropriately  HEAD - NC/AT   EYES- PERRL, EOMI  ENT: Airway patent,no stridor/drooling/pooling of secretions, +edematous tongue with mild elevation, +submandibular induration and edema most prominent to r. and central submandibular regions, unable to visualize uvula, no trismus, no lesions on visualized oropharynx   NECK: Neck supple, submandibular changes as above  PULMONARY - CTA b/l, symmetric breath sounds.   CARDIAC -s1s2, RRR, no M,G,R  ABDOMEN - +BS, ND, NT, soft, no guarding, no rebound, no masses   BACK - no CVA tenderness, Normal  spine   EXTREMITIES - FROM, symmetric pulses, capillary refill < 2 seconds, no edema   SKIN - no rash or bruising   NEUROLOGIC - alert, speech clear, no focal deficits  PSYCH -nl mood/affect, nl insight. GEN - NAD;ao3, answering questions appropriately  HEAD - NC/AT   EYES- PERRL, EOMI  ENT: Airway patent,no stridor/drooling/pooling of secretions, +edematous tongue with mild elevation, +submandibular induration and edema most prominent to r. and central submandibular regions, unable to visualize uvula, mild trismus, no lesions on visualized oropharynx, +multiple dental caries to mandibular molars   NECK: Neck supple, submandibular changes as above  PULMONARY - CTA b/l, symmetric breath sounds.   CARDIAC -s1s2, RRR, no M,G,R  ABDOMEN - +BS, ND, NT, soft, no guarding, no rebound, no masses   BACK - no CVA tenderness, Normal  spine   EXTREMITIES - FROM, no deformity  SKIN - no rash or bruising   NEUROLOGIC - alert, speech clear, no focal deficits  PSYCH -nl mood/affect, nl insight.  a/p-Patient with facial and tongue swelling worsening over last 24 hours, seen by dentist who was concerned for abscess and sent her to ed. On eval, patient reporting pain and swelling to submandibular region R>L, progressed over 24 hours, also now tongue feels swollen. Able to swallow and breathe. No associated fevers, chills, cp, sob,  cough, abd pain, vomiting, dysuria. Patient protecting airway though with regions of induration and tongue concerned for possible evolving ludwigs angina. Charge RN called and patient moved to TR B, labs/cultures/abx/stat ent c/s placed, ct ordered to further eval. patient aware of plan.

## 2024-03-27 DIAGNOSIS — R22.0 LOCALIZED SWELLING, MASS AND LUMP, HEAD: ICD-10-CM

## 2024-03-27 LAB
A1C WITH ESTIMATED AVERAGE GLUCOSE RESULT: 7 % — HIGH (ref 4–5.6)
ANION GAP SERPL CALC-SCNC: 13 MMOL/L — SIGNIFICANT CHANGE UP (ref 7–14)
BUN SERPL-MCNC: 14 MG/DL — SIGNIFICANT CHANGE UP (ref 7–23)
CALCIUM SERPL-MCNC: 9.6 MG/DL — SIGNIFICANT CHANGE UP (ref 8.4–10.5)
CHLORIDE SERPL-SCNC: 100 MMOL/L — SIGNIFICANT CHANGE UP (ref 98–107)
CO2 SERPL-SCNC: 24 MMOL/L — SIGNIFICANT CHANGE UP (ref 22–31)
CREAT SERPL-MCNC: 0.79 MG/DL — SIGNIFICANT CHANGE UP (ref 0.5–1.3)
EGFR: 83 ML/MIN/1.73M2 — SIGNIFICANT CHANGE UP
ESTIMATED AVERAGE GLUCOSE: 154 — SIGNIFICANT CHANGE UP
GLUCOSE BLDC GLUCOMTR-MCNC: 182 MG/DL — HIGH (ref 70–99)
GLUCOSE BLDC GLUCOMTR-MCNC: 210 MG/DL — HIGH (ref 70–99)
GLUCOSE BLDC GLUCOMTR-MCNC: 233 MG/DL — HIGH (ref 70–99)
GLUCOSE BLDC GLUCOMTR-MCNC: 278 MG/DL — HIGH (ref 70–99)
GLUCOSE SERPL-MCNC: 309 MG/DL — HIGH (ref 70–99)
GRAM STN FLD: ABNORMAL
HCT VFR BLD CALC: 40.6 % — SIGNIFICANT CHANGE UP (ref 34.5–45)
HCV AB S/CO SERPL IA: 0.17 S/CO — SIGNIFICANT CHANGE UP (ref 0–0.99)
HCV AB SERPL-IMP: SIGNIFICANT CHANGE UP
HGB BLD-MCNC: 13.4 G/DL — SIGNIFICANT CHANGE UP (ref 11.5–15.5)
MAGNESIUM SERPL-MCNC: 2.3 MG/DL — SIGNIFICANT CHANGE UP (ref 1.6–2.6)
MCHC RBC-ENTMCNC: 28.5 PG — SIGNIFICANT CHANGE UP (ref 27–34)
MCHC RBC-ENTMCNC: 33 GM/DL — SIGNIFICANT CHANGE UP (ref 32–36)
MCV RBC AUTO: 86.4 FL — SIGNIFICANT CHANGE UP (ref 80–100)
NRBC # BLD: 0 /100 WBCS — SIGNIFICANT CHANGE UP (ref 0–0)
NRBC # FLD: 0 K/UL — SIGNIFICANT CHANGE UP (ref 0–0)
PHOSPHATE SERPL-MCNC: 2.4 MG/DL — LOW (ref 2.5–4.5)
PLATELET # BLD AUTO: 442 K/UL — HIGH (ref 150–400)
POTASSIUM SERPL-MCNC: 3.3 MMOL/L — LOW (ref 3.5–5.3)
POTASSIUM SERPL-SCNC: 3.3 MMOL/L — LOW (ref 3.5–5.3)
RBC # BLD: 4.7 M/UL — SIGNIFICANT CHANGE UP (ref 3.8–5.2)
RBC # FLD: 14.1 % — SIGNIFICANT CHANGE UP (ref 10.3–14.5)
SODIUM SERPL-SCNC: 137 MMOL/L — SIGNIFICANT CHANGE UP (ref 135–145)
SPECIMEN SOURCE: SIGNIFICANT CHANGE UP
WBC # BLD: 19.56 K/UL — HIGH (ref 3.8–10.5)
WBC # FLD AUTO: 19.56 K/UL — HIGH (ref 3.8–10.5)

## 2024-03-27 PROCEDURE — 99231 SBSQ HOSP IP/OBS SF/LOW 25: CPT

## 2024-03-27 RX ORDER — ENOXAPARIN SODIUM 100 MG/ML
40 INJECTION SUBCUTANEOUS EVERY 24 HOURS
Refills: 0 | Status: DISCONTINUED | OUTPATIENT
Start: 2024-03-27 | End: 2024-03-27

## 2024-03-27 RX ORDER — ENOXAPARIN SODIUM 100 MG/ML
40 INJECTION SUBCUTANEOUS EVERY 24 HOURS
Refills: 0 | Status: DISCONTINUED | OUTPATIENT
Start: 2024-03-27 | End: 2024-03-29

## 2024-03-27 RX ORDER — INSULIN LISPRO 100/ML
VIAL (ML) SUBCUTANEOUS
Refills: 0 | Status: DISCONTINUED | OUTPATIENT
Start: 2024-03-27 | End: 2024-03-29

## 2024-03-27 RX ORDER — DEXTROSE 50 % IN WATER 50 %
25 SYRINGE (ML) INTRAVENOUS ONCE
Refills: 0 | Status: DISCONTINUED | OUTPATIENT
Start: 2024-03-27 | End: 2024-03-29

## 2024-03-27 RX ORDER — SODIUM CHLORIDE 9 MG/ML
1000 INJECTION, SOLUTION INTRAVENOUS
Refills: 0 | Status: DISCONTINUED | OUTPATIENT
Start: 2024-03-27 | End: 2024-03-27

## 2024-03-27 RX ORDER — INSULIN LISPRO 100/ML
VIAL (ML) SUBCUTANEOUS
Refills: 0 | Status: DISCONTINUED | OUTPATIENT
Start: 2024-03-27 | End: 2024-03-27

## 2024-03-27 RX ORDER — DEXTROSE 50 % IN WATER 50 %
12.5 SYRINGE (ML) INTRAVENOUS ONCE
Refills: 0 | Status: DISCONTINUED | OUTPATIENT
Start: 2024-03-27 | End: 2024-03-29

## 2024-03-27 RX ORDER — AMLODIPINE BESYLATE 2.5 MG/1
7.5 TABLET ORAL DAILY
Refills: 0 | Status: DISCONTINUED | OUTPATIENT
Start: 2024-03-27 | End: 2024-03-29

## 2024-03-27 RX ORDER — BENZOCAINE AND MENTHOL 5; 1 G/100ML; G/100ML
1 LIQUID ORAL EVERY 6 HOURS
Refills: 0 | Status: DISCONTINUED | OUTPATIENT
Start: 2024-03-27 | End: 2024-03-29

## 2024-03-27 RX ORDER — DEXTROSE 50 % IN WATER 50 %
15 SYRINGE (ML) INTRAVENOUS ONCE
Refills: 0 | Status: DISCONTINUED | OUTPATIENT
Start: 2024-03-27 | End: 2024-03-29

## 2024-03-27 RX ORDER — HYDROMORPHONE HYDROCHLORIDE 2 MG/ML
0.5 INJECTION INTRAMUSCULAR; INTRAVENOUS; SUBCUTANEOUS ONCE
Refills: 0 | Status: DISCONTINUED | OUTPATIENT
Start: 2024-03-27 | End: 2024-03-27

## 2024-03-27 RX ORDER — GLUCAGON INJECTION, SOLUTION 0.5 MG/.1ML
1 INJECTION, SOLUTION SUBCUTANEOUS ONCE
Refills: 0 | Status: DISCONTINUED | OUTPATIENT
Start: 2024-03-27 | End: 2024-03-29

## 2024-03-27 RX ORDER — AMLODIPINE BESYLATE 2.5 MG/1
7.5 TABLET ORAL DAILY
Refills: 0 | Status: DISCONTINUED | OUTPATIENT
Start: 2024-03-27 | End: 2024-03-27

## 2024-03-27 RX ORDER — ENOXAPARIN SODIUM 100 MG/ML
40 INJECTION SUBCUTANEOUS EVERY 12 HOURS
Refills: 0 | Status: DISCONTINUED | OUTPATIENT
Start: 2024-03-27 | End: 2024-03-27

## 2024-03-27 RX ORDER — INSULIN LISPRO 100/ML
VIAL (ML) SUBCUTANEOUS EVERY 6 HOURS
Refills: 0 | Status: DISCONTINUED | OUTPATIENT
Start: 2024-03-27 | End: 2024-03-27

## 2024-03-27 RX ORDER — DEXAMETHASONE 0.5 MG/5ML
10 ELIXIR ORAL EVERY 8 HOURS
Refills: 0 | Status: COMPLETED | OUTPATIENT
Start: 2024-03-27 | End: 2024-03-27

## 2024-03-27 RX ADMIN — Medication 102 MILLIGRAM(S): at 22:25

## 2024-03-27 RX ADMIN — BENZOCAINE AND MENTHOL 1 LOZENGE: 5; 1 LIQUID ORAL at 11:18

## 2024-03-27 RX ADMIN — Medication 102 MILLIGRAM(S): at 13:57

## 2024-03-27 RX ADMIN — CHLORHEXIDINE GLUCONATE 15 MILLILITER(S): 213 SOLUTION TOPICAL at 17:09

## 2024-03-27 RX ADMIN — BENZOCAINE AND MENTHOL 1 LOZENGE: 5; 1 LIQUID ORAL at 17:09

## 2024-03-27 RX ADMIN — Medication 2: at 22:25

## 2024-03-27 RX ADMIN — Medication 650 MILLIGRAM(S): at 22:15

## 2024-03-27 RX ADMIN — Medication 2: at 12:21

## 2024-03-27 RX ADMIN — CHLORHEXIDINE GLUCONATE 15 MILLILITER(S): 213 SOLUTION TOPICAL at 05:09

## 2024-03-27 RX ADMIN — BENZOCAINE AND MENTHOL 1 LOZENGE: 5; 1 LIQUID ORAL at 07:28

## 2024-03-27 RX ADMIN — Medication 102 MILLIGRAM(S): at 05:09

## 2024-03-27 RX ADMIN — AMPICILLIN SODIUM AND SULBACTAM SODIUM 200 GRAM(S): 250; 125 INJECTION, POWDER, FOR SUSPENSION INTRAMUSCULAR; INTRAVENOUS at 17:10

## 2024-03-27 RX ADMIN — Medication 650 MILLIGRAM(S): at 22:45

## 2024-03-27 RX ADMIN — AMLODIPINE BESYLATE 7.5 MILLIGRAM(S): 2.5 TABLET ORAL at 18:41

## 2024-03-27 RX ADMIN — Medication 4: at 18:40

## 2024-03-27 RX ADMIN — AMPICILLIN SODIUM AND SULBACTAM SODIUM 200 GRAM(S): 250; 125 INJECTION, POWDER, FOR SUSPENSION INTRAMUSCULAR; INTRAVENOUS at 11:18

## 2024-03-27 RX ADMIN — ENOXAPARIN SODIUM 40 MILLIGRAM(S): 100 INJECTION SUBCUTANEOUS at 13:25

## 2024-03-27 RX ADMIN — AMPICILLIN SODIUM AND SULBACTAM SODIUM 200 GRAM(S): 250; 125 INJECTION, POWDER, FOR SUSPENSION INTRAMUSCULAR; INTRAVENOUS at 05:10

## 2024-03-27 RX ADMIN — Medication 600 MILLIGRAM(S): at 05:09

## 2024-03-27 RX ADMIN — HYDROMORPHONE HYDROCHLORIDE 0.5 MILLIGRAM(S): 2 INJECTION INTRAMUSCULAR; INTRAVENOUS; SUBCUTANEOUS at 16:31

## 2024-03-27 RX ADMIN — HYDROMORPHONE HYDROCHLORIDE 0.5 MILLIGRAM(S): 2 INJECTION INTRAMUSCULAR; INTRAVENOUS; SUBCUTANEOUS at 16:46

## 2024-03-27 RX ADMIN — SODIUM CHLORIDE 135 MILLILITER(S): 9 INJECTION, SOLUTION INTRAVENOUS at 06:37

## 2024-03-27 NOTE — PROGRESS NOTE ADULT - SUBJECTIVE AND OBJECTIVE BOX
HPI:  63F w/ PMH of HTN, DM, presents to [facility] ED w/ 1 day history of R sided facial swelling. Pt reports that swelling has worsened significantly in last 12 hours. Pt went to dentist who told pt to come to ED for evaluation. Pt w/ significant difficulty breathing and swallowing. Pt is also reporting dental pain associated w/ teeth 30 and 31. WBC on arrival was 24, scoped by ENT who noted significant inflammation and edema of arytenoids and recommended emergent intubation for airway protection. Pt admitted to [facility] 3/26/24, on Unasyn, decadron, pain meds for symptomatic improvement.    INTERVAL EVENTS:  NAEON, aVSS    ICU Vital Signs Last 24 Hrs  T(C): 36.4 (27 Mar 2024 06:00), Max: 37.7 (26 Mar 2024 13:23)  T(F): 97.6 (27 Mar 2024 06:00), Max: 99.9 (26 Mar 2024 13:23)  HR: 72 (27 Mar 2024 06:00) (72 - 112)  BP: 140/79 (27 Mar 2024 06:00) (140/79 - 192/129)  BP(mean): --  ABP: --  ABP(mean): --  RR: 17 (27 Mar 2024 06:00) (16 - 18)  SpO2: 99% (27 Mar 2024 06:00) (97% - 100%)    O2 Parameters below as of 27 Mar 2024 06:00  Patient On (Oxygen Delivery Method): room air    MEDICATIONS  (STANDING):  acetaminophen   Oral Liquid .. 650 milliGRAM(s) Oral every 6 hours  amLODIPine   Tablet 5 milliGRAM(s) Oral at bedtime  ampicillin/sulbactam  IVPB      ampicillin/sulbactam  IVPB 3 Gram(s) IV Intermittent every 6 hours  chlorhexidine 0.12% Liquid 15 milliLiter(s) Swish and Spit two times a day  dexAMETHasone  IVPB 10 milliGRAM(s) IV Intermittent every 8 hours  dextrose 5%. 1000 milliLiter(s) (50 mL/Hr) IV Continuous <Continuous>  dextrose 5%. 1000 milliLiter(s) (100 mL/Hr) IV Continuous <Continuous>  dextrose 50% Injectable 25 Gram(s) IV Push once  dextrose 50% Injectable 12.5 Gram(s) IV Push once  dextrose 50% Injectable 25 Gram(s) IV Push once  enoxaparin Injectable 40 milliGRAM(s) SubCutaneous every 24 hours  glucagon  Injectable 1 milliGRAM(s) IntraMuscular once  ibuprofen  Suspension. 600 milliGRAM(s) Oral every 6 hours  insulin lispro (ADMELOG) corrective regimen sliding scale   SubCutaneous three times a day before meals  lactated ringers. 1000 milliLiter(s) (135 mL/Hr) IV Continuous <Continuous>    MEDICATIONS  (PRN):  dextrose Oral Gel 15 Gram(s) Oral once PRN Blood Glucose LESS THAN 70 milliGRAM(s)/deciliter  metoclopramide Injectable 10 milliGRAM(s) IV Push once PRN only zofran ineffective  ondansetron Injectable 4 milliGRAM(s) IV Push every 8 hours PRN Nausea and/or Vomiting  oxyCODONE    Solution 5 milliGRAM(s) Oral every 4 hours PRN Moderate Pain (4 - 6)  oxyCODONE    Solution 10 milliGRAM(s) Oral every 4 hours PRN Severe Pain (7 - 10)    PHYSICAL EXAM:  Gen: AAox3, NAD  Face: R submandibular/neck swelling and erythema. Swelling is firm and tender to palpation. No trismus or lymphadenopathy. Difficulty palpating inferior border of mandible on R side.   Oral: Carious teeth #30-32. No purulence evident intraorally. Pain to percussion on #31. Dysphagia and odynophagia. Significant elevation of FOM.                             13.4   19.56 )-----------( 442      ( 27 Mar 2024 05:00 )             40.6     03-27    137  |  100  |  14  ----------------------------<  309<H>  3.3<L>   |  24  |  0.79    Ca    9.6      27 Mar 2024 05:00  Phos  2.4     03-27  Mg     2.30     03-27    TPro  9.2<H>  /  Alb  4.6  /  TBili  1.8<H>  /  DBili  x   /  AST  23  /  ALT  17  /  AlkPhos  82  03-26

## 2024-03-27 NOTE — PROGRESS NOTE ADULT - PROBLEM SELECTOR PLAN 1
1. Continue with 2 more doses of decadron.  2. Abx as per primary team  3. If concern for SMG sialoadenitis would recommend warm compresses qid, MASH of Gland qid, hydration, and sialogogues.   4. No ENT intervention, will follow for airway monitoring. 1. Continue with 2 more doses of decadron.  2. Abx as per primary team, I and D as per primary team.  3. If concern for SMG sialoadenitis would recommend warm compresses qid, MASH of Gland qid, hydration, and sialogogues.   4. No ENT intervention, will follow for airway monitoring.

## 2024-03-27 NOTE — PROVIDER CONTACT NOTE (OTHER) - REASON
patient's /88, HR 70, patient asymptomatic, as per pt she takes BP medications at home vitals rechecked - patient's /88, HR 70, patient asymptomatic, as per pt she takes BP medications at home

## 2024-03-27 NOTE — PROGRESS NOTE ADULT - SUBJECTIVE AND OBJECTIVE BOX
Patient feeling better. Improved swallowing. Denies any dysphagia, SOB, dyspnea, fevers, chills. On IV unasyn.     AVSS  HEENT: Right SMG swelling. Mild tenderness to palpation.   mouth: No FOM edema, FOM soft.     FOE:  Unable to scope through nose. Patient not tolerating after lidocaine soaked swab.  Scoped orally: No base of tongue edema, + Right pharyngeal fullness. No epiglottic edema. + mild Arytenoid edema. Not obstructing. Vocal cords mobile, patent.

## 2024-03-27 NOTE — PROVIDER CONTACT NOTE (OTHER) - ASSESSMENT
patient's /01, HR 71, patient asymptomatic, as per pt she takes BP medications at home patient's /90, HR 71, patient asymptomatic, as per pt she takes BP medications at home

## 2024-03-27 NOTE — PROVIDER CONTACT NOTE (CRITICAL VALUE NOTIFICATION) - SITUATION
Abnormal abscess culture moderate polymor phononuclear leukocytes in the low power field, few gram positive cocci enclustered and pairs, and rare gram negative rods, 3 are going to be in the oil power field

## 2024-03-27 NOTE — PATIENT PROFILE ADULT - FALL HARM RISK - RISK INTERVENTIONS

## 2024-03-27 NOTE — PROGRESS NOTE ADULT - ASSESSMENT
63F w/ PMH of HTN, DM, presents to [facility] ED w/ 1 day history of R sided facial swelling w/ dx of obstructive sialadenitis.       Plan:  -IV Unasyn  -IV dexamethasone   -Multimodal pain control   -Medicine consult for HTN and DM    Juanis Loza DMD  Oral and Maxillofacial Surgery  [facility] OMFS Pager [phone]  Available on [employer] Teams 63F w/ PMH of HTN, DM, presents to [facility] ED w/ 1 day history of R sided facial swelling w/ dx of obstructive sialadenitis.       Plan:  -IV Unasyn  -IV dexamethasone   -Multimodal pain control         Juanis Loza DMD  Oral and Maxillofacial Surgery  [facility] Pager [phone]  Available on Microsoft Teams

## 2024-03-27 NOTE — CONSULT NOTE ADULT - SUBJECTIVE AND OBJECTIVE BOX
HPI: 63y Female w PMH DM managed with lifestyle modifications, HTN, and RA who presents for evaluation of lip and mouth swelling x1d. Pt reports on 3/25 PM she developed R lower molar pain and swelling. By 3/26 AM, she developed tongue and floor of mouth swelling. Sx have been progressive since onset. Reports difficulty breathing, swallowing, and voice changes. Has never had sx like this before. Reports subjective fevers and chills.     Allergies    No Known Allergies    Intolerances        PAST MEDICAL & SURGICAL HISTORY:  HTN (hypertension)      Rheumatoid arthritis      DM (diabetes mellitus)  not on medication, managed with lifestyle modifications (3/24)          SOCIAL HISTORY:  Tobacco History:  ETOH Use:   Drug Use:     FAMILY HISTORY:      REVIEW OF SYSTEMS  All systems reviewed, negative except for as noted above      MEDICATIONS:  Antiinfectives:   ampicillin/sulbactam  IVPB      ampicillin/sulbactam  IVPB 3 Gram(s) IV Intermittent every 6 hours      Hematologic/Anticoagulation:  enoxaparin Injectable 40 milliGRAM(s) SubCutaneous every 24 hours      Pain medications/Neuro:  acetaminophen   Oral Liquid .. 650 milliGRAM(s) Oral every 6 hours  ibuprofen  Suspension. 600 milliGRAM(s) Oral every 6 hours  metoclopramide Injectable 10 milliGRAM(s) IV Push once PRN  ondansetron Injectable 4 milliGRAM(s) IV Push every 8 hours PRN  oxyCODONE    Solution 5 milliGRAM(s) Oral every 4 hours PRN  oxyCODONE    Solution 10 milliGRAM(s) Oral every 4 hours PRN      IV fluids:  dextrose 5%. 1000 milliLiter(s) IV Continuous <Continuous>  dextrose 5%. 1000 milliLiter(s) IV Continuous <Continuous>  lactated ringers. 1000 milliLiter(s) IV Continuous <Continuous>      Endocrine Medications:   dexAMETHasone  IVPB 10 milliGRAM(s) IV Intermittent every 8 hours  dextrose 50% Injectable 25 Gram(s) IV Push once  dextrose 50% Injectable 12.5 Gram(s) IV Push once  dextrose 50% Injectable 25 Gram(s) IV Push once  dextrose Oral Gel 15 Gram(s) Oral once PRN  glucagon  Injectable 1 milliGRAM(s) IntraMuscular once  insulin lispro (ADMELOG) corrective regimen sliding scale   SubCutaneous every 6 hours      All other standing medications:   amLODIPine   Tablet 5 milliGRAM(s) Oral at bedtime  benzocaine/menthol Lozenge 1 Lozenge Oral every 6 hours  chlorhexidine 0.12% Liquid 15 milliLiter(s) Swish and Spit two times a day      All other PRN medications:      Vital Signs Last 24 Hrs  T(C): 36.6 (27 Mar 2024 10:00), Max: 37.7 (26 Mar 2024 13:23)  T(F): 97.8 (27 Mar 2024 10:00), Max: 99.9 (26 Mar 2024 13:23)  HR: 73 (27 Mar 2024 10:00) (72 - 112)  BP: 145/85 (27 Mar 2024 10:00) (140/79 - 192/129)  BP(mean): --  RR: 19 (27 Mar 2024 10:00) (16 - 19)  SpO2: 100% (27 Mar 2024 10:00) (97% - 100%)    Parameters below as of 27 Mar 2024 10:00  Patient On (Oxygen Delivery Method): room air        LABS:  CBC-    03-27    137  |  100  |  14  ----------------------------<  309<H>  3.3<L>   |  24  |  0.79    Ca    9.6      27 Mar 2024 05:00  Phos  2.4     03-27  Mg     2.30     03-27    TPro  9.2<H>  /  Alb  4.6  /  TBili  1.8<H>  /  DBili  x   /  AST  23  /  ALT  17  /  AlkPhos  82  03-26    Coagulation Studies-  PT/INR - ( 26 Mar 2024 15:11 )   PT: 12.5 sec;   INR: 1.12 ratio         PTT - ( 26 Mar 2024 15:11 )  PTT:34.2 sec  Endocrine Panel-  --  --  9.6 mg/dL  --  --  10.3 mg/dL        PHYSICAL EXAM:    ENT EXAM-   Constitutional: Well-developed, well-nourished.  Muffled voice, no stridor or stertor though in discomfort  Head:  normocephalic, atraumatic.   Ears:  Ear canals both clear.   Nose:  Septum intact, midline, deviated.  Inferior turbinates normal bilateral  OC/OP:  Diffuse tongue swelling, floor of mouth swollen and tender to palpation, TTP along right molar/gingiva  Neck:  Trachea midline.  Thyroid, parotid and submandibular glands normal.  Lymph:  No cervical adenopathy.    LARYNGOSCOPY EXAM:     -Verbal consent was obtained from patient prior to procedure.  Indication:  Anesthesia: Afrin spray was applied to the nasal cavities.    Flexible laryngoscopy was performed and revealed the following:    -- Nasopharynx had no mass or exudate.    -- Base of tongue was edematous and abutting epiglottis    -- Vallecula was clear    -- Epiglottis, both aryepiglottic folds and both false vocal folds were normal    -- R arytenoid edema obstructing full view of vocal cords    -- Post cricoid area was clear.    -- Interarytenoid edema was absent    The patient tolerated the procedure well.      RADIOLOGY & ADDITIONAL STUDIES:      Assessment/Plan:     63y Female w PMH DM managed with lifestyle modifications, HTN, and RA who presents for evaluation of lip and mouth swelling x1d. Pt reports on 3/25 PM she developed R lower molar pain and swelling. By 3/26 AM, she developed tongue and floor of mouth swelling. Sx have been progressive since onset. Reports difficulty breathing, swallowing, and voice changes. Sx concerning for Ludwigs angina 2/2 odontogenic source, requires expeditious management given rapid development of sx and concern for airway compromise on scope exam and history taking.   - NPO/mIVF  - consented for fiberoptic nasotracheal intubation possible trach with ENT  - IV abx  - OMFS consult  - d/w attending Dr. Hoyt

## 2024-03-27 NOTE — PROVIDER CONTACT NOTE (CRITICAL VALUE NOTIFICATION) - ASSESSMENT
Abnormal abscess culture moderate polymor phononuclear leukocytes in the low power field, few gram positive cocci enclustered and pairs, and rare gram negative rods, 3 are going to be in the oil power field no

## 2024-03-27 NOTE — PROVIDER CONTACT NOTE (OTHER) - ACTION/TREATMENT ORDERED:
provider aware, no new medications ordered, recheck vitals in 30 minutes
provider aware, no new orders

## 2024-03-27 NOTE — PATIENT PROFILE ADULT - MEDICATIONS/VISITS
Take tylenol/motrin for pain. Recommend motrin every 6 hours for inflammation.  If you begin vomiting, headache worsens, or loss of consciousness. Please call [phone] and report to the ED.     Please follow up with Orthopedic physician 10/24/2023     no

## 2024-03-28 ENCOUNTER — RESULT REVIEW (OUTPATIENT)
Age: 64
End: 2024-03-28

## 2024-03-28 ENCOUNTER — TRANSCRIPTION ENCOUNTER (OUTPATIENT)
Age: 64
End: 2024-03-28

## 2024-03-28 DIAGNOSIS — E11.9 TYPE 2 DIABETES MELLITUS WITHOUT COMPLICATIONS: ICD-10-CM

## 2024-03-28 DIAGNOSIS — I10 ESSENTIAL (PRIMARY) HYPERTENSION: ICD-10-CM

## 2024-03-28 DIAGNOSIS — M06.9 RHEUMATOID ARTHRITIS, UNSPECIFIED: ICD-10-CM

## 2024-03-28 DIAGNOSIS — K11.20 SIALOADENITIS, UNSPECIFIED: ICD-10-CM

## 2024-03-28 LAB
ANION GAP SERPL CALC-SCNC: 12 MMOL/L — SIGNIFICANT CHANGE UP (ref 7–14)
BASOPHILS # BLD AUTO: 0.04 K/UL — SIGNIFICANT CHANGE UP (ref 0–0.2)
BASOPHILS NFR BLD AUTO: 0.2 % — SIGNIFICANT CHANGE UP (ref 0–2)
BUN SERPL-MCNC: 19 MG/DL — SIGNIFICANT CHANGE UP (ref 7–23)
CALCIUM SERPL-MCNC: 9.5 MG/DL — SIGNIFICANT CHANGE UP (ref 8.4–10.5)
CHLORIDE SERPL-SCNC: 101 MMOL/L — SIGNIFICANT CHANGE UP (ref 98–107)
CO2 SERPL-SCNC: 24 MMOL/L — SIGNIFICANT CHANGE UP (ref 22–31)
CREAT SERPL-MCNC: 0.76 MG/DL — SIGNIFICANT CHANGE UP (ref 0.5–1.3)
EGFR: 87 ML/MIN/1.73M2 — SIGNIFICANT CHANGE UP
EOSINOPHIL # BLD AUTO: 0 K/UL — SIGNIFICANT CHANGE UP (ref 0–0.5)
EOSINOPHIL NFR BLD AUTO: 0 % — SIGNIFICANT CHANGE UP (ref 0–6)
GLUCOSE BLDC GLUCOMTR-MCNC: 141 MG/DL — HIGH (ref 70–99)
GLUCOSE BLDC GLUCOMTR-MCNC: 147 MG/DL — HIGH (ref 70–99)
GLUCOSE BLDC GLUCOMTR-MCNC: 161 MG/DL — HIGH (ref 70–99)
GLUCOSE BLDC GLUCOMTR-MCNC: 225 MG/DL — HIGH (ref 70–99)
GLUCOSE SERPL-MCNC: 235 MG/DL — HIGH (ref 70–99)
HCT VFR BLD CALC: 38.2 % — SIGNIFICANT CHANGE UP (ref 34.5–45)
HGB BLD-MCNC: 13.1 G/DL — SIGNIFICANT CHANGE UP (ref 11.5–15.5)
IANC: 20.44 K/UL — HIGH (ref 1.8–7.4)
IMM GRANULOCYTES NFR BLD AUTO: 1.7 % — HIGH (ref 0–0.9)
LYMPHOCYTES # BLD AUTO: 1.85 K/UL — SIGNIFICANT CHANGE UP (ref 1–3.3)
LYMPHOCYTES # BLD AUTO: 7.8 % — LOW (ref 13–44)
MAGNESIUM SERPL-MCNC: 2.3 MG/DL — SIGNIFICANT CHANGE UP (ref 1.6–2.6)
MCHC RBC-ENTMCNC: 29.6 PG — SIGNIFICANT CHANGE UP (ref 27–34)
MCHC RBC-ENTMCNC: 34.3 GM/DL — SIGNIFICANT CHANGE UP (ref 32–36)
MCV RBC AUTO: 86.2 FL — SIGNIFICANT CHANGE UP (ref 80–100)
MONOCYTES # BLD AUTO: 1.07 K/UL — HIGH (ref 0–0.9)
MONOCYTES NFR BLD AUTO: 4.5 % — SIGNIFICANT CHANGE UP (ref 2–14)
NEUTROPHILS # BLD AUTO: 20.44 K/UL — HIGH (ref 1.8–7.4)
NEUTROPHILS NFR BLD AUTO: 85.8 % — HIGH (ref 43–77)
NRBC # BLD: 0 /100 WBCS — SIGNIFICANT CHANGE UP (ref 0–0)
NRBC # FLD: 0 K/UL — SIGNIFICANT CHANGE UP (ref 0–0)
PHOSPHATE SERPL-MCNC: 2.5 MG/DL — SIGNIFICANT CHANGE UP (ref 2.5–4.5)
PLATELET # BLD AUTO: 476 K/UL — HIGH (ref 150–400)
POTASSIUM SERPL-MCNC: 3.3 MMOL/L — LOW (ref 3.5–5.3)
POTASSIUM SERPL-SCNC: 3.3 MMOL/L — LOW (ref 3.5–5.3)
RBC # BLD: 4.43 M/UL — SIGNIFICANT CHANGE UP (ref 3.8–5.2)
RBC # FLD: 13.7 % — SIGNIFICANT CHANGE UP (ref 10.3–14.5)
SODIUM SERPL-SCNC: 137 MMOL/L — SIGNIFICANT CHANGE UP (ref 135–145)
WBC # BLD: 23.81 K/UL — HIGH (ref 3.8–10.5)
WBC # FLD AUTO: 23.81 K/UL — HIGH (ref 3.8–10.5)

## 2024-03-28 PROCEDURE — 99231 SBSQ HOSP IP/OBS SF/LOW 25: CPT

## 2024-03-28 PROCEDURE — 99223 1ST HOSP IP/OBS HIGH 75: CPT

## 2024-03-28 RX ORDER — HYDROCODONE BITARTRATE AND ACETAMINOPHEN 7.5; 325 MG/15ML; MG/15ML
1 SOLUTION ORAL
Qty: 8 | Refills: 0
Start: 2024-03-28

## 2024-03-28 RX ORDER — SODIUM CHLORIDE 9 MG/ML
1000 INJECTION, SOLUTION INTRAVENOUS ONCE
Refills: 0 | Status: COMPLETED | OUTPATIENT
Start: 2024-03-28 | End: 2024-03-28

## 2024-03-28 RX ORDER — AMOXICILLIN 250 MG/5ML
1 SUSPENSION, RECONSTITUTED, ORAL (ML) ORAL
Qty: 14 | Refills: 0
Start: 2024-03-28 | End: 2024-04-03

## 2024-03-28 RX ORDER — HYDROMORPHONE HYDROCHLORIDE 2 MG/ML
0.5 INJECTION INTRAMUSCULAR; INTRAVENOUS; SUBCUTANEOUS ONCE
Refills: 0 | Status: DISCONTINUED | OUTPATIENT
Start: 2024-03-28 | End: 2024-03-28

## 2024-03-28 RX ORDER — IBUPROFEN 200 MG
1 TABLET ORAL
Qty: 28 | Refills: 0
Start: 2024-03-28 | End: 2024-04-03

## 2024-03-28 RX ORDER — CHLORHEXIDINE GLUCONATE 213 G/1000ML
15 SOLUTION TOPICAL
Qty: 1 | Refills: 0
Start: 2024-03-28 | End: 2024-04-03

## 2024-03-28 RX ORDER — LABETALOL HCL 100 MG
1 TABLET ORAL
Refills: 0 | DISCHARGE

## 2024-03-28 RX ORDER — AMLODIPINE BESYLATE 2.5 MG/1
1 TABLET ORAL
Refills: 0 | DISCHARGE

## 2024-03-28 RX ADMIN — BENZOCAINE AND MENTHOL 1 LOZENGE: 5; 1 LIQUID ORAL at 12:07

## 2024-03-28 RX ADMIN — Medication 4: at 09:12

## 2024-03-28 RX ADMIN — BENZOCAINE AND MENTHOL 1 LOZENGE: 5; 1 LIQUID ORAL at 00:09

## 2024-03-28 RX ADMIN — ENOXAPARIN SODIUM 40 MILLIGRAM(S): 100 INJECTION SUBCUTANEOUS at 14:36

## 2024-03-28 RX ADMIN — AMPICILLIN SODIUM AND SULBACTAM SODIUM 200 GRAM(S): 250; 125 INJECTION, POWDER, FOR SUSPENSION INTRAMUSCULAR; INTRAVENOUS at 23:13

## 2024-03-28 RX ADMIN — Medication 650 MILLIGRAM(S): at 07:03

## 2024-03-28 RX ADMIN — SODIUM CHLORIDE 1000 MILLILITER(S): 9 INJECTION, SOLUTION INTRAVENOUS at 09:09

## 2024-03-28 RX ADMIN — AMPICILLIN SODIUM AND SULBACTAM SODIUM 200 GRAM(S): 250; 125 INJECTION, POWDER, FOR SUSPENSION INTRAMUSCULAR; INTRAVENOUS at 07:04

## 2024-03-28 RX ADMIN — AMLODIPINE BESYLATE 7.5 MILLIGRAM(S): 2.5 TABLET ORAL at 07:04

## 2024-03-28 RX ADMIN — Medication 2: at 12:11

## 2024-03-28 RX ADMIN — Medication 600 MILLIGRAM(S): at 03:31

## 2024-03-28 RX ADMIN — CHLORHEXIDINE GLUCONATE 15 MILLILITER(S): 213 SOLUTION TOPICAL at 07:04

## 2024-03-28 RX ADMIN — CHLORHEXIDINE GLUCONATE 15 MILLILITER(S): 213 SOLUTION TOPICAL at 17:38

## 2024-03-28 RX ADMIN — AMPICILLIN SODIUM AND SULBACTAM SODIUM 200 GRAM(S): 250; 125 INJECTION, POWDER, FOR SUSPENSION INTRAMUSCULAR; INTRAVENOUS at 12:01

## 2024-03-28 RX ADMIN — BENZOCAINE AND MENTHOL 1 LOZENGE: 5; 1 LIQUID ORAL at 23:13

## 2024-03-28 RX ADMIN — AMPICILLIN SODIUM AND SULBACTAM SODIUM 200 GRAM(S): 250; 125 INJECTION, POWDER, FOR SUSPENSION INTRAMUSCULAR; INTRAVENOUS at 00:09

## 2024-03-28 RX ADMIN — AMPICILLIN SODIUM AND SULBACTAM SODIUM 200 GRAM(S): 250; 125 INJECTION, POWDER, FOR SUSPENSION INTRAMUSCULAR; INTRAVENOUS at 17:38

## 2024-03-28 RX ADMIN — Medication 600 MILLIGRAM(S): at 17:38

## 2024-03-28 RX ADMIN — Medication 600 MILLIGRAM(S): at 23:13

## 2024-03-28 RX ADMIN — BENZOCAINE AND MENTHOL 1 LOZENGE: 5; 1 LIQUID ORAL at 17:39

## 2024-03-28 RX ADMIN — Medication 600 MILLIGRAM(S): at 12:07

## 2024-03-28 RX ADMIN — Medication 600 MILLIGRAM(S): at 13:00

## 2024-03-28 RX ADMIN — BENZOCAINE AND MENTHOL 1 LOZENGE: 5; 1 LIQUID ORAL at 07:04

## 2024-03-28 RX ADMIN — Medication 600 MILLIGRAM(S): at 18:15

## 2024-03-28 RX ADMIN — Medication 600 MILLIGRAM(S): at 04:01

## 2024-03-28 NOTE — DISCHARGE NOTE PROVIDER - PROVIDER TOKENS
FREE:[LAST:[Valdez],FIRST:[Joshua],PHONE:[phone]],FAX:[(   )    -],ADDRESS:[[address] [place] [address], [address]],FOLLOWUP:[1-3 days]] FREE:[LAST:[Valdez],FIRST:[Joshua],PHONE:[phone]],FAX:[(   )    -],ADDRESS:[[address] [place] [address], [address]],SCHEDULEDAPPT:[04/03/2024]]

## 2024-03-28 NOTE — DISCHARGE NOTE NURSING/CASE MANAGEMENT/SOCIAL WORK - NSDCPNINST_GEN_ALL_CORE
Do not take any narcotic medication while driving or operating any machinery. Report any of the following signs to your provider: nausea, fever, chills, drainage(color green or yellow). If you have any bleeding at the site contact your provider. If you pain is not controlled with the prescribed dose, let your provider know this.

## 2024-03-28 NOTE — DISCHARGE NOTE NURSING/CASE MANAGEMENT/SOCIAL WORK - NSDCPEFALRISK_GEN_ALL_CORE
For information on Fall & Injury Prevention, visit: [email or web address] OR  [email or web address] OR  https://www.cdc.gov/steadi/patient.html

## 2024-03-28 NOTE — PROGRESS NOTE ADULT - ASSESSMENT
63F w/ PMH of HTN, DM, presents to [facility] ED w/ 1 day history of R sided facial swelling w/ dx of obstructive sialadenitis, progressing well w/o acute events.       Plan:  -re-attempt bedside expression/drainage of obstructive sialodenitis after 0.5mg dilaudid given  -IV Unasyn during inpatient stay  -Multimodal pain control   -trend WBC  -potential d/c home today, given improving symptoms + lab results        Oral and Maxillofacial Surgery  [facility] Pager [phone]  Available on Microsoft Teams 63F w/ PMH of HTN, DM, presents to [facility] ED w/ 1 day history of R sided facial swelling w/ dx of obstructive sialadenitis, progressing well w/o acute events.     Plan:  -IV Unasyn during inpatient stay  -Possible d/c home later today- Augmentin 875 BID for 7 days, Peridex BID, pain meds PRN   -F/u outpatient at [facility] in 1 week. Please call [phone] w/ any questions or concerns.   -Aggressive hydration   -Multimodal pain control   -Trend WBC          Oral and Maxillofacial Surgery  [facility] Pager [phone]  Available on Microsoft Teams

## 2024-03-28 NOTE — DISCHARGE NOTE PROVIDER - NSRESEARCHGRANT_MLMHIDDEN_GEN_A_CORE
Physical Therapy Daily Treatment     Visit Count: 2  Plan of Care Dates: Initial: 11/14/18 Through: 1/09/19  Insurance Information: physical and speech therapy combined cap of $2010 per calendar year, has used 5 therapy visits prior this year  Next Referring Provider Visit: none scheduled    Referred by: Bruce Swanson MD  Medical Diagnosis (from order):  Left foot pain [M79.672] Â - Primary   Insurance: 1. MEDICARE  2. [place] MEDICAID    Date of Onset: new onset; 8/2018   Diagnosis Precautions: none  Chart reviewed: Relevant co-morbidities, allergies, tests and medications: stroke 10/2017,  2/2018  An [employer] video  was utilized during this session using the patient's primary/preferred language [place]     SUBJECTIVE   Less foot pain. Has more pain in the knees with PRE's  Current Pain: not rated/10. Â   Functional Change: walking more in the house    OBJECTIVE   Gait with sbqc in right hand    Treatment   Therapeutic Exercise:   Nu step seat 8 arms 9 5 min  Double leg press 70# x 10, 60# x 10   Left leg press 35# 2 x 10  prostretch 2 x 20 sec left  Prone STM gastroc  PROM DF, hamstrings and piriformis       Current Home Program (not performed this date except as noted above):   gastroc stretch    ASSESSMENT   Improved PROM with better foot clearance walking    Pain after treatment: 0/10  Result of above outlined education: Verbalizes understanding and Needs reinforcement    Goals: To be obtained by end of this plan of care:  1. Patient independent with modified and progressed home exercise program.  2. Patient will decrease involved ankle pain/symptoms to 0-2/10  to aid in ambulating on level and unlevel surfaces. 3. Patient will increase involved ankle DF active range of motion to 0-15Â° to aid in ambulating on level and unlevel surfaces. 4. Patient will increase involved ankle strength to within functional limits to aid in ambulating on level and unlevel surfaces.   Â      PLAN   As above, add PF PRE's THERAPY DAILY BILLING   Primary Insurance:  MEDICARE  Secondary Insurance: [place] MEDICAID    Evaluation Procedures:  No evaluation codes were used on this date of service    Timed Procedures:  Manual Therapy, 11 minutes  Neuromuscular Re-Education, 9 minutes  Therapeutic Exercise, 20 minutes    Untimed Procedures:  No untimed codes were used on this date of service    Total Treatment Time: 40 minutes        G-Code:  G-Code Score ABN form  reporting not required this treatment session  Modifier based on outcome measure(s)/functional testing/clinical judgement as listed above    The referring provider's electronic or written signature on the evaluation authorizes the therapy plan of care and certifies the need for these services, furnished under this plan of care while under their care.   Referring provider signature on file yes

## 2024-03-28 NOTE — CONSULT NOTE ADULT - PROBLEM SELECTOR RECOMMENDATION 4
- f/u rheum in regards to restart Humira and methotrexate as outpt - f/u rheum in regards to restart Humira and methotrexate as outpt  - c/w folic acid

## 2024-03-28 NOTE — PROGRESS NOTE ADULT - SUBJECTIVE AND OBJECTIVE BOX
HPI:  63F w/ PMH of HTN, DM, presents to [facility] ED w/ 1 day history of R sided facial swelling. Pt reports that swelling has worsened significantly in last 12 hours. Pt went to dentist who told pt to come to ED for evaluation. Pt w/ significant difficulty breathing and swallowing. Pt is also reporting dental pain associated w/ teeth 30 and 31. WBC on arrival was 24, scoped by ENT who noted significant inflammation and edema of arytenoids and recommended emergent intubation for airway protection. [place] [facility] [place] on Unasyn, decadron, pain meds for symptomatic improvement.    INTERVAL EVENTS:  NAEON, afebrile, aVSS  -bedside expression/drainage of sialodenitis attempted after 0.5mg dilaudid admin w/ minimal drainage  -amlodipine 5mg -> 7.5mg  -admelog ss low -> mod dose reg  -decadron 10mg IV finished    ICU Vital Signs Last 24 Hrs  T(C): 36.9 (28 Mar 2024 02:13), Max: 36.9 (28 Mar 2024 02:13)  T(F): 98.4 (28 Mar 2024 02:13), Max: 98.4 (28 Mar 2024 02:13)  HR: 66 (28 Mar 2024 02:13) (66 - 80)  BP: 128/74 (28 Mar 2024 02:13) (128/74 - 160/88)  BP(mean): --  ABP: --  ABP(mean): --  RR: 16 (28 Mar 2024 02:13) (16 - 19)  SpO2: 97% (28 Mar 2024 02:13) (95% - 100%)    O2 Parameters below as of 28 Mar 2024 02:13  Patient On (Oxygen Delivery Method): room air    I&O's Detail    26 Mar 2024 07:01  -  27 Mar 2024 07:00  --------------------------------------------------------  IN:    Lactated Ringers: 400 mL    Lactated Ringers: 400 mL  Total IN: 800 mL    OUT:    Voided (mL): 600 mL  Total OUT: 600 mL    Total NET: 200 mL      27 Mar 2024 07:01  -  28 Mar 2024 05:53  --------------------------------------------------------  IN:    IV PiggyBack: 100 mL    Lactated Ringers: 810 mL    Oral Fluid: 960 mL  Total IN: 1870 mL    OUT:  Total OUT: 0 mL    Total NET: 1870 mL    MEDICATIONS  (STANDING):  acetaminophen   Oral Liquid .. 650 milliGRAM(s) Oral every 6 hours  amLODIPine   Tablet 7.5 milliGRAM(s) Oral daily  ampicillin/sulbactam  IVPB      ampicillin/sulbactam  IVPB 3 Gram(s) IV Intermittent every 6 hours  benzocaine/menthol Lozenge 1 Lozenge Oral every 6 hours  chlorhexidine 0.12% Liquid 15 milliLiter(s) Swish and Spit two times a day  dextrose 5%. 1000 milliLiter(s) (100 mL/Hr) IV Continuous <Continuous>  dextrose 5%. 1000 milliLiter(s) (50 mL/Hr) IV Continuous <Continuous>  dextrose 50% Injectable 25 Gram(s) IV Push once  dextrose 50% Injectable 25 Gram(s) IV Push once  dextrose 50% Injectable 12.5 Gram(s) IV Push once  enoxaparin Injectable 40 milliGRAM(s) SubCutaneous every 24 hours  glucagon  Injectable 1 milliGRAM(s) IntraMuscular once  HYDROmorphone  Injectable 0.5 milliGRAM(s) IV Push once  ibuprofen  Suspension. 600 milliGRAM(s) Oral every 6 hours  insulin lispro (ADMELOG) corrective regimen sliding scale   SubCutaneous three times a day before meals    MEDICATIONS  (PRN):  dextrose Oral Gel 15 Gram(s) Oral once PRN Blood Glucose LESS THAN 70 milliGRAM(s)/deciliter  metoclopramide Injectable 10 milliGRAM(s) IV Push once PRN only zofran ineffective  ondansetron Injectable 4 milliGRAM(s) IV Push every 8 hours PRN Nausea and/or Vomiting  oxyCODONE    Solution 5 milliGRAM(s) Oral every 4 hours PRN Moderate Pain (4 - 6)  oxyCODONE    Solution 10 milliGRAM(s) Oral every 4 hours PRN Severe Pain (7 - 10)    PHYSICAL EXAM:  Gen: AAox3, NAD  Face: R submandibular/neck swelling and erythema. Swelling is firm and tender to palpation. No trismus or lymphadenopathy. Difficulty palpating inferior border of mandible on R side.   Oral: Carious teeth #30-32. No purulence evident intraorally. Pain to percussion on #31. Dysphagia and odynophagia. Significant elevation of FOM.                           13.4   19.56 )-----------( 442      ( 27 Mar 2024 05:00 )             40.6     03-27    137  |  100  |  14  ----------------------------<  309<H>  3.3<L>   |  24  |  0.79    Ca    9.6      27 Mar 2024 05:00  Phos  2.4     03-27  Mg     2.30     03-27    TPro  9.2<H>  /  Alb  4.6  /  TBili  1.8<H>  /  DBili  x   /  AST  23  /  ALT  17  /  AlkPhos  82  03-26      Culture - Abscess with Gram Stain (collected 27 Mar 2024 00:20)  Source: .Abscess mouth  Gram Stain (27 Mar 2024 13:50):    Moderate polymorphonuclear leukocytes per low power field    Few Gram Positive Cocci in Clusters per oil power field    Few Gram positive cocci in pairs per oil power field    Rare Gram Negative Rods per oil power field    Culture - Blood (collected 26 Mar 2024 14:43)  Source: .Blood Blood-Peripheral  Preliminary Report (27 Mar 2024 19:02):    No growth at 24 hours    Culture - Blood (collected 26 Mar 2024 14:43)  Source: .Blood Blood-Peripheral  Preliminary Report (27 Mar 2024 19:02):    No growth at 24 hours           HPI:  63F w/ PMH of HTN, DM, presents to [facility] ED w/ 1 day history of R sided facial swelling. Pt reports that swelling has worsened significantly in last 12 hours. Pt went to dentist who told pt to come to ED for evaluation. Pt w/ significant difficulty breathing and swallowing. Pt is also reporting dental pain associated w/ teeth 30 and 31. WBC on arrival was 24, scoped by ENT who noted significant inflammation and edema of arytenoids and recommended emergent intubation for airway protection. Pt admitted to [facility] [place] 3/26/24, on Unasyn, decadron, pain meds for symptomatic improvement.    INTERVAL EVENTS:  NAEON, afebrile, aVSS  -bedside expression/drainage of sialodenitis attempted after 0.5mg dilaudid admin w/ minimal drainage  -amlodipine 5mg -> 7.5mg  -admelog ss low -> mod dose reg  -decadron 10mg IV finished    ICU Vital Signs Last 24 Hrs  T(C): 36.9 (28 Mar 2024 02:13), Max: 36.9 (28 Mar 2024 02:13)  T(F): 98.4 (28 Mar 2024 02:13), Max: 98.4 (28 Mar 2024 02:13)  HR: 66 (28 Mar 2024 02:13) (66 - 80)  BP: 128/74 (28 Mar 2024 02:13) (128/74 - 160/88)  BP(mean): --  ABP: --  ABP(mean): --  RR: 16 (28 Mar 2024 02:13) (16 - 19)  SpO2: 97% (28 Mar 2024 02:13) (95% - 100%)    O2 Parameters below as of 28 Mar 2024 02:13  Patient On (Oxygen Delivery Method): room air    I&O's Detail    26 Mar 2024 07:01  -  27 Mar 2024 07:00  --------------------------------------------------------  IN:    Lactated Ringers: 400 mL    Lactated Ringers: 400 mL  Total IN: 800 mL    OUT:    Voided (mL): 600 mL  Total OUT: 600 mL    Total NET: 200 mL      27 Mar 2024 07:01  -  28 Mar 2024 05:53  --------------------------------------------------------  IN:    IV PiggyBack: 100 mL    Lactated Ringers: 810 mL    Oral Fluid: 960 mL  Total IN: 1870 mL    OUT:  Total OUT: 0 mL    Total NET: 1870 mL    MEDICATIONS  (STANDING):  acetaminophen   Oral Liquid .. 650 milliGRAM(s) Oral every 6 hours  amLODIPine   Tablet 7.5 milliGRAM(s) Oral daily  ampicillin/sulbactam  IVPB      ampicillin/sulbactam  IVPB 3 Gram(s) IV Intermittent every 6 hours  benzocaine/menthol Lozenge 1 Lozenge Oral every 6 hours  chlorhexidine 0.12% Liquid 15 milliLiter(s) Swish and Spit two times a day  dextrose 5%. 1000 milliLiter(s) (100 mL/Hr) IV Continuous <Continuous>  dextrose 5%. 1000 milliLiter(s) (50 mL/Hr) IV Continuous <Continuous>  dextrose 50% Injectable 25 Gram(s) IV Push once  dextrose 50% Injectable 25 Gram(s) IV Push once  dextrose 50% Injectable 12.5 Gram(s) IV Push once  enoxaparin Injectable 40 milliGRAM(s) SubCutaneous every 24 hours  glucagon  Injectable 1 milliGRAM(s) IntraMuscular once  HYDROmorphone  Injectable 0.5 milliGRAM(s) IV Push once  ibuprofen  Suspension. 600 milliGRAM(s) Oral every 6 hours  insulin lispro (ADMELOG) corrective regimen sliding scale   SubCutaneous three times a day before meals    MEDICATIONS  (PRN):  dextrose Oral Gel 15 Gram(s) Oral once PRN Blood Glucose LESS THAN 70 milliGRAM(s)/deciliter  metoclopramide Injectable 10 milliGRAM(s) IV Push once PRN only zofran ineffective  ondansetron Injectable 4 milliGRAM(s) IV Push every 8 hours PRN Nausea and/or Vomiting  oxyCODONE    Solution 5 milliGRAM(s) Oral every 4 hours PRN Moderate Pain (4 - 6)  oxyCODONE    Solution 10 milliGRAM(s) Oral every 4 hours PRN Severe Pain (7 - 10)    PHYSICAL EXAM:  Gen: AAox3, NAD  Face: R submandibular/neck swelling and erythema. Swelling is firm and tender to palpation. No trismus or lymphadenopathy. Inferior border of mandible palpable b/l.   Oral: Carious teeth #30-32. Pain to percussion on #31. Dysphagia and odynophagia. Minimal elevation of FOM                         13.4   19.56 )-----------( 442      ( 27 Mar 2024 05:00 )             40.6     03-27    137  |  100  |  14  ----------------------------<  309<H>  3.3<L>   |  24  |  0.79    Ca    9.6      27 Mar 2024 05:00  Phos  2.4     03-27  Mg     2.30     03-27    TPro  9.2<H>  /  Alb  4.6  /  TBili  1.8<H>  /  DBili  x   /  AST  23  /  ALT  17  /  AlkPhos  82  03-26      Culture - Abscess with Gram Stain (collected 27 Mar 2024 00:20)  Source: .Abscess mouth  Gram Stain (27 Mar 2024 13:50):    Moderate polymorphonuclear leukocytes per low power field    Few Gram Positive Cocci in Clusters per oil power field    Few Gram positive cocci in pairs per oil power field    Rare Gram Negative Rods per oil power field    Culture - Blood (collected 26 Mar 2024 14:43)  Source: .Blood Blood-Peripheral  Preliminary Report (27 Mar 2024 19:02):    No growth at 24 hours    Culture - Blood (collected 26 Mar 2024 14:43)  Source: .Blood Blood-Peripheral  Preliminary Report (27 Mar 2024 19:02):    No growth at 24 hours

## 2024-03-28 NOTE — PROGRESS NOTE ADULT - PROBLEM SELECTOR PLAN 1
1. Continue to MASH, warm compresses, Abx as per primary team, hydration and sialogogues   2. Pain control  3. Patient can be discharged from ENT standpoint. She can follow up with oral surgery for possible planning of SMG stone extraction. If patient needs ENT eval she can call  to schedule an appointment.

## 2024-03-28 NOTE — DISCHARGE NOTE PROVIDER - NSDCMRMEDTOKEN_GEN_ALL_CORE_FT
labetalol 300 mg oral tablet: 1 tab(s) orally 2 times a day  Norvasc 5 mg oral tablet: 1 tab(s) orally once a day   amoxicillin 875 mg oral tablet: 1 tab(s) orally 2 times a day  chlorhexidine 0.12% mucous membrane liquid: 15 milliliter(s) orally 2 times a day  hydrocodone-acetaminophen 5 mg-325 mg oral tablet: 1 tab(s) orally every 6 hours as needed for  severe pain MDD: 4 tablets  ibuprofen 800 mg oral tablet: 1 tab(s) orally every 6 hours  labetalol 300 mg oral tablet: 1 tab(s) orally 2 times a day  Norvasc 5 mg oral tablet: 1 tab(s) orally once a day

## 2024-03-28 NOTE — DISCHARGE NOTE PROVIDER - NSDCFUADDAPPT_GEN_ALL_CORE_FT
Please call [phone] or [phone] for follow up appointment Please call [phone] or [phone] for follow up appointments

## 2024-03-28 NOTE — CONSULT NOTE ADULT - SUBJECTIVE AND OBJECTIVE BOX
HPI:  63F w/ PMH of HTN, DM, presents to [facility] ED w/ 1 day history of R sided facial swelling. Pt reports that swelling has worsened significantly in last 12 hours. Pt went to dentist who told pt to come to ED for evaluation. Pt w/ significant difficulty breathing and swallowing. Pt is also reporting dental pain associated w/ teeth 30 and 31. WBC on arrival was 24, scoped by ENT who noted significant inflammation and edema of arytenoids and recommended emergent intubation for airway protection. CT showed sialoadnitis and was given steroid and antibiotics with improvement.       PAST MEDICAL & SURGICAL HISTORY:  HTN (hypertension)      Rheumatoid arthritis      DM (diabetes mellitus)  not on medication, managed with lifestyle modifications (3/24)          Social History:     FAMILY HISTORY:      Allergies    No Known Allergies    Intolerances          MEDICATIONS  (STANDING):  acetaminophen   Oral Liquid .. 650 milliGRAM(s) Oral every 6 hours  amLODIPine   Tablet 7.5 milliGRAM(s) Oral daily  ampicillin/sulbactam  IVPB      ampicillin/sulbactam  IVPB 3 Gram(s) IV Intermittent every 6 hours  benzocaine/menthol Lozenge 1 Lozenge Oral every 6 hours  chlorhexidine 0.12% Liquid 15 milliLiter(s) Swish and Spit two times a day  dextrose 5%. 1000 milliLiter(s) (50 mL/Hr) IV Continuous <Continuous>  dextrose 5%. 1000 milliLiter(s) (100 mL/Hr) IV Continuous <Continuous>  dextrose 50% Injectable 25 Gram(s) IV Push once  dextrose 50% Injectable 25 Gram(s) IV Push once  dextrose 50% Injectable 12.5 Gram(s) IV Push once  enoxaparin Injectable 40 milliGRAM(s) SubCutaneous every 24 hours  glucagon  Injectable 1 milliGRAM(s) IntraMuscular once  ibuprofen  Suspension. 600 milliGRAM(s) Oral every 6 hours  insulin lispro (ADMELOG) corrective regimen sliding scale   SubCutaneous three times a day before meals    MEDICATIONS  (PRN):  dextrose Oral Gel 15 Gram(s) Oral once PRN Blood Glucose LESS THAN 70 milliGRAM(s)/deciliter  metoclopramide Injectable 10 milliGRAM(s) IV Push once PRN only zofran ineffective  ondansetron Injectable 4 milliGRAM(s) IV Push every 8 hours PRN Nausea and/or Vomiting  oxyCODONE    Solution 5 milliGRAM(s) Oral every 4 hours PRN Moderate Pain (4 - 6)  oxyCODONE    Solution 10 milliGRAM(s) Oral every 4 hours PRN Severe Pain (7 - 10)      Review of Systems:   CONSTITUTIONAL: No fever, weight loss, or fatigue  EYES: No eye pain, visual disturbances, or discharge  ENMT:  No difficulty hearing, tinnitus, vertigo; No sinus or throat pain  NECK: No pain or stiffness  BREASTS: No pain, masses, or nipple discharge  RESPIRATORY: No cough, wheezing, chills or hemoptysis; No shortness of breath  CARDIOVASCULAR: No chest pain, palpitations, dizziness, or leg swelling  GASTROINTESTINAL: No abdominal or epigastric pain. No nausea, vomiting, or hematemesis; No diarrhea or constipation. No melena or hematochezia.  GENITOURINARY: No dysuria, frequency, hematuria, or incontinence  NEUROLOGICAL: No headaches, memory loss, loss of strength, numbness, or tremors  SKIN: No itching, burning, rashes, or lesions   LYMPH NODES: No enlarged glands  ENDOCRINE: No heat or cold intolerance; No hair loss  MUSCULOSKELETAL: No joint pain or swelling; No muscle, back, or extremity pain  PSYCHIATRIC: No depression, anxiety, mood swings, or difficulty sleeping  HEME/LYMPH: No easy bruising, or bleeding gums  ALLERY AND IMMUNOLOGIC: No hives or eczema          CAPILLARY BLOOD GLUCOSE      POCT Blood Glucose.: 161 mg/dL (28 Mar 2024 12:11)  POCT Blood Glucose.: 225 mg/dL (28 Mar 2024 08:29)  POCT Blood Glucose.: 182 mg/dL (27 Mar 2024 22:17)  POCT Blood Glucose.: 210 mg/dL (27 Mar 2024 18:20)    I&O's Summary    27 Mar 2024 07:01  -  28 Mar 2024 07:00  --------------------------------------------------------  IN: 1970 mL / OUT: 0 mL / NET: 1970 mL      Vital Signs Last 24 Hrs  T(C): 36.5 (28 Mar 2024 09:48), Max: 36.9 (28 Mar 2024 02:13)  T(F): 97.7 (28 Mar 2024 09:48), Max: 98.4 (28 Mar 2024 02:13)  HR: 66 (28 Mar 2024 09:48) (66 - 80)  BP: 129/70 (28 Mar 2024 09:48) (128/74 - 160/88)  BP(mean): --  RR: 17 (28 Mar 2024 09:48) (16 - 19)  SpO2: 100% (28 Mar 2024 09:48) (95% - 100%)    Parameters below as of 28 Mar 2024 09:48  Patient On (Oxygen Delivery Method): room air        PHYSICAL EXAM:  GENERAL: NAD, well-developed  HEAD:  Atraumatic, Normocephalic  EYES: EOMI, PERRLA, conjunctiva and sclera clear  NECK: Supple, No JVD  CHEST/LUNG: Clear to auscultation bilaterally; No wheeze  HEART: Regular rate and rhythm; No murmurs, rubs, or gallops  ABDOMEN: Soft, Nontender, Nondistended; Bowel sounds present  EXTREMITIES:  2+ Peripheral Pulses, No clubbing, cyanosis, or edema  PSYCH: AAOx3  NEUROLOGY: non-focal  SKIN: No rashes or lesions    LABS:                        13.1   23.81 )-----------( 476      ( 28 Mar 2024 06:11 )             38.2     03-28    137  |  101  |  19  ----------------------------<  235<H>  3.3<L>   |  24  |  0.76    Ca    9.5      28 Mar 2024 06:11  Phos  2.5     03-28  Mg     2.30     03-28    TPro  9.2<H>  /  Alb  4.6  /  TBili  1.8<H>  /  DBili  x   /  AST  23  /  ALT  17  /  AlkPhos  82  03-26    PT/INR - ( 26 Mar 2024 15:11 )   PT: 12.5 sec;   INR: 1.12 ratio         PTT - ( 26 Mar 2024 15:11 )  PTT:34.2 sec      Urinalysis Basic - ( 28 Mar 2024 06:11 )    Color: x / Appearance: x / SG: x / pH: x  Gluc: 235 mg/dL / Ketone: x  / Bili: x / Urobili: x   Blood: x / Protein: x / Nitrite: x   Leuk Esterase: x / RBC: x / WBC x   Sq Epi: x / Non Sq Epi: x / Bacteria: x        RADIOLOGY & ADDITIONAL TESTS:    Imaging Personally Reviewed:< from: CT Neck Soft Tissue w/ IV Cont (03.26.24 @ 17:17) >  IMPRESSION:    Impacted 8 mm stone at distal right submandibularduct.    There is diffuse edema in right floor of mouth extending to   laryngopharynx which mildly narrows the airway. No abscess formation.   Right submandibular gland enlarged with adjacent edema and small reactive   lymph nodes.    *Incidental findings for follow-up*    1. Intracranial 3 mm right internal carotid artery aneurysm, medially   projecting at either dural ring or SHA level. Follow-up CTA is   recommended in 6-12 months to ascertain stability.    2. A 2 cm thyroid nodule at isthmus.Ultrasound suggested for further   characterization and management.    --- End of Report ---    < end of copied text >      Consultant(s) Notes Reviewed:      Care Discussed with Consultants/Other Providers:   HPI:  63F w/ PMH of HTN, DM, presents to [facility] ED w/ 1 day history of R sided facial swelling. Pt reports that swelling has worsened significantly in last 12 hours. Pt went to dentist who told pt to come to ED for evaluation. Pt w/ significant difficulty breathing and swallowing. Pt is also reporting dental pain associated w/ teeth 30 and 31. WBC on arrival was 24, scoped by ENT who noted significant inflammation and edema of arytenoids and recommended emergent intubation for airway protection. CT showed sialoadenitis and was given steroid and antibiotics with improvement.       PAST MEDICAL & SURGICAL HISTORY:  HTN (hypertension)      Rheumatoid arthritis      DM (diabetes mellitus)  not on medication, managed with lifestyle modifications (3/24)          Social History: walks around without aid; denies T/A/D     FAMILY HISTORY: non contributory      Allergies    No Known Allergies    Intolerances          MEDICATIONS  (STANDING):  acetaminophen   Oral Liquid .. 650 milliGRAM(s) Oral every 6 hours  amLODIPine   Tablet 7.5 milliGRAM(s) Oral daily  ampicillin/sulbactam  IVPB      ampicillin/sulbactam  IVPB 3 Gram(s) IV Intermittent every 6 hours  benzocaine/menthol Lozenge 1 Lozenge Oral every 6 hours  chlorhexidine 0.12% Liquid 15 milliLiter(s) Swish and Spit two times a day  dextrose 5%. 1000 milliLiter(s) (50 mL/Hr) IV Continuous <Continuous>  dextrose 5%. 1000 milliLiter(s) (100 mL/Hr) IV Continuous <Continuous>  dextrose 50% Injectable 25 Gram(s) IV Push once  dextrose 50% Injectable 25 Gram(s) IV Push once  dextrose 50% Injectable 12.5 Gram(s) IV Push once  enoxaparin Injectable 40 milliGRAM(s) SubCutaneous every 24 hours  glucagon  Injectable 1 milliGRAM(s) IntraMuscular once  ibuprofen  Suspension. 600 milliGRAM(s) Oral every 6 hours  insulin lispro (ADMELOG) corrective regimen sliding scale   SubCutaneous three times a day before meals    MEDICATIONS  (PRN):  dextrose Oral Gel 15 Gram(s) Oral once PRN Blood Glucose LESS THAN 70 milliGRAM(s)/deciliter  metoclopramide Injectable 10 milliGRAM(s) IV Push once PRN only zofran ineffective  ondansetron Injectable 4 milliGRAM(s) IV Push every 8 hours PRN Nausea and/or Vomiting  oxyCODONE    Solution 5 milliGRAM(s) Oral every 4 hours PRN Moderate Pain (4 - 6)  oxyCODONE    Solution 10 milliGRAM(s) Oral every 4 hours PRN Severe Pain (7 - 10)      Review of Systems:   CONSTITUTIONAL: No fever, weight loss, or fatigue  EYES: No eye pain, visual disturbances, or discharge  ENMT:  No difficulty hearing, tinnitus, vertigo; No sinus or throat pain  NECK: No pain or stiffness  BREASTS: No pain, masses, or nipple discharge  RESPIRATORY: No cough, wheezing, chills or hemoptysis; No shortness of breath  CARDIOVASCULAR: No chest pain, palpitations, dizziness, or leg swelling  GASTROINTESTINAL: No abdominal or epigastric pain. No nausea, vomiting, or hematemesis; No diarrhea or constipation. No melena or hematochezia.  GENITOURINARY: No dysuria, frequency, hematuria, or incontinence  NEUROLOGICAL: No headaches, memory loss, loss of strength, numbness, or tremors  SKIN: No itching, burning, rashes, or lesions   LYMPH NODES: No enlarged glands  ENDOCRINE: No heat or cold intolerance; No hair loss  MUSCULOSKELETAL: No joint pain or swelling; No muscle, back, or extremity pain  PSYCHIATRIC: No depression, anxiety, mood swings, or difficulty sleeping  HEME/LYMPH: No easy bruising, or bleeding gums  ALLERGY AND IMMUNOLOGIC: No hives or eczema          CAPILLARY BLOOD GLUCOSE      POCT Blood Glucose.: 161 mg/dL (28 Mar 2024 12:11)  POCT Blood Glucose.: 225 mg/dL (28 Mar 2024 08:29)  POCT Blood Glucose.: 182 mg/dL (27 Mar 2024 22:17)  POCT Blood Glucose.: 210 mg/dL (27 Mar 2024 18:20)    I&O's Summary    27 Mar 2024 07:01  -  28 Mar 2024 07:00  --------------------------------------------------------  IN: 1970 mL / OUT: 0 mL / NET: 1970 mL      Vital Signs Last 24 Hrs  T(C): 36.5 (28 Mar 2024 09:48), Max: 36.9 (28 Mar 2024 02:13)  T(F): 97.7 (28 Mar 2024 09:48), Max: 98.4 (28 Mar 2024 02:13)  HR: 66 (28 Mar 2024 09:48) (66 - 80)  BP: 129/70 (28 Mar 2024 09:48) (128/74 - 160/88)  BP(mean): --  RR: 17 (28 Mar 2024 09:48) (16 - 19)  SpO2: 100% (28 Mar 2024 09:48) (95% - 100%)    Parameters below as of 28 Mar 2024 09:48  Patient On (Oxygen Delivery Method): room air        PHYSICAL EXAM:  GENERAL: NAD, well-developed  HEAD:  Atraumatic, Normocephalic  EYES: EOMI, PERRLA, conjunctiva and sclera clear  NECK: Supple, No JVD  CHEST/LUNG: Clear to auscultation bilaterally; No wheeze  HEART: Regular rate and rhythm; No murmurs, rubs, or gallops  ABDOMEN: Soft, Nontender, Nondistended; Bowel sounds present  EXTREMITIES:  2+ Peripheral Pulses, No clubbing, cyanosis, or edema  PSYCH: AAOx3  NEUROLOGY: non-focal  SKIN: No rashes or lesions    LABS:                        13.1   23.81 )-----------( 476      ( 28 Mar 2024 06:11 )             38.2     03-28    137  |  101  |  19  ----------------------------<  235<H>  3.3<L>   |  24  |  0.76    Ca    9.5      28 Mar 2024 06:11  Phos  2.5     03-28  Mg     2.30     03-28    TPro  9.2<H>  /  Alb  4.6  /  TBili  1.8<H>  /  DBili  x   /  AST  23  /  ALT  17  /  AlkPhos  82  03-26    PT/INR - ( 26 Mar 2024 15:11 )   PT: 12.5 sec;   INR: 1.12 ratio         PTT - ( 26 Mar 2024 15:11 )  PTT:34.2 sec      Urinalysis Basic - ( 28 Mar 2024 06:11 )    Color: x / Appearance: x / SG: x / pH: x  Gluc: 235 mg/dL / Ketone: x  / Bili: x / Urobili: x   Blood: x / Protein: x / Nitrite: x   Leuk Esterase: x / RBC: x / WBC x   Sq Epi: x / Non Sq Epi: x / Bacteria: x        RADIOLOGY & ADDITIONAL TESTS:    Imaging Personally Reviewed:< from: CT Neck Soft Tissue w/ IV Cont (03.26.24 @ 17:17) >  IMPRESSION:    Impacted 8 mm stone at distal right submandibularduct.    There is diffuse edema in right floor of mouth extending to   laryngopharynx which mildly narrows the airway. No abscess formation.   Right submandibular gland enlarged with adjacent edema and small reactive   lymph nodes.    *Incidental findings for follow-up*    1. Intracranial 3 mm right internal carotid artery aneurysm, medially   projecting at either dural ring or SHA level. Follow-up CTA is   recommended in 6-12 months to ascertain stability.    2. A 2 cm thyroid nodule at isthmus.Ultrasound suggested for further   characterization and management.    --- End of Report ---    < end of copied text >      Consultant(s) Notes Reviewed:      Care Discussed with Consultants/Other Providers:

## 2024-03-28 NOTE — CONSULT NOTE ADULT - ASSESSMENT
62 yo F w/ hx HTN, DM p/w RT facial swelling. CT showing sialoadenitis. treated conservatively with steroid and antibiotics. Medicine consulted for daibetes management  62 yo F w/ hx HTN, DM p/w RT facial swelling. CT showing sialoadenitis. treated conservatively with steroid and antibiotics. Medicine consulted for diabetes management

## 2024-03-28 NOTE — PROGRESS NOTE ADULT - SUBJECTIVE AND OBJECTIVE BOX
Patient states she is feeling better. Pain is improving. Denies any fevers, chills, N/V, rigors. Tolerating oral diet. Denies any SOB, dyspnea or tongue swelling.    AVSS, P02 96%  HEENT:  Head: + mild SMG swelling and tenderness to palpation, no erythema.   Mouth: No floor of mouth edema,.

## 2024-03-28 NOTE — DISCHARGE NOTE NURSING/CASE MANAGEMENT/SOCIAL WORK - PATIENT PORTAL LINK FT
You can access the FollowMyHealth Patient Portal offered by [facility] by registering at the following website: [email or web address]. By joining [facility]’s FollowMyHealth portal, you will also be able to view your health information using other applications (apps) compatible with our system.

## 2024-03-28 NOTE — DISCHARGE NOTE PROVIDER - CARE PROVIDER_API CALL
Joshua Kellogg  [address], [place], [address]  Phone: [phone]  Fax: (   )    -  Follow Up Time: 1-3 days   Joshua Kellogg  [address], [place], [address]  Phone: [phone]  Fax: (   )    -  Scheduled Appointment: 04/03/2024

## 2024-03-28 NOTE — CONSULT NOTE ADULT - PROBLEM SELECTOR RECOMMENDATION 9
- steroid induced hyperglycemia now off steroid   - monitor FS qAC/HS  - c/w home tradjenta on discharge

## 2024-03-28 NOTE — DISCHARGE NOTE PROVIDER - HOSPITAL COURSE
HPI:  63year old female patient with past medical history of hypertension and diabetes mellitus presents to [facility] ED with 1 day history of Right sided facial swelling. Pt reports that swelling has worsened significantly in last 12 hours. The patient went to dentist who told her to come to ED for evaluation. The patient initially presented with significant difficulty breathing and swallowing and dental pain associated w/ teeth 30 and 31. WBC on arrival was 24, scoped by ENT who noted significant inflammation and edema of arytenoids and recommended emergent intubation for airway protection. [place] [facility] [place], received Unasyn, decadron, pain meds for symptomatic improvement.    Patient's conditions improved over the course of 2 days, with improvements in the white blood cell counts results. She is progressing well without acute events, and is ready for discharge today. HPI:  63year old female patient with past medical history of hypertension and diabetes mellitus presents to [facility] ED with 1 day history of Right sided facial swelling. Pt reports that swelling has worsened significantly in last 12 hours. The patient went to dentist who told her to come to ED for evaluation. The patient initially presented with significant difficulty breathing and swallowing and dental pain associated w/ teeth 30 and 31. WBC on arrival was 24, scoped by ENT who noted significant inflammation and edema of arytenoids and recommended emergent intubation for airway protection. Pt was admitted to [facility] on 3/26/24, received Unasyn, decadron, pain meds for symptomatic improvement.    Patient's conditions improved over the course of 2 days, with improvements in the white blood cell counts results. She is progressing well without acute events, and is ready for discharge today.    - Please follow up with [facility] in 1 week on 4/3/24: Dr. Joshua Kellogg  - Please call [phone] to confirm appt    [facility]   OMFS/ Dental clinic: 1st floor  [address] [place] [address]  [phone] HPI:  63year old female patient with past medical history of hypertension and diabetes mellitus presents to [facility] ED with 1 day history of Right sided facial swelling. Pt reports that swelling has worsened significantly in last 12 hours. The patient went to dentist who told her to come to ED for evaluation. The patient initially presented with significant difficulty breathing and swallowing and dental pain associated w/ teeth 30 and 31. WBC on arrival was 24, scoped by ENT who noted significant inflammation and edema of arytenoids and recommended emergent intubation for airway protection. Pt was admitted to [facility] on 3/26/24, received Unasyn, decadron, pain meds for symptomatic improvement.    Patient's conditions improved over the course of 2 days, with improvements in the white blood cell counts results. She is progressing well without acute events, and is ready for discharge today.    - Please follow up with [facility] in 1 week on 4/3/24: Dr. Joshua Kellogg  - Please call [phone] or [phone] to confirm appt    [facility]   OMFS/ Dental clinic: 1st floor  [address] [place] [address]  [phone]

## 2024-03-28 NOTE — CONSULT NOTE ADULT - TIME BILLING
Reviewed lab data, radiology results, consultants' recommendations, documentation in [place], discussed with family, ACP, interdisciplinary staff and/or intervention were performed.

## 2024-03-29 VITALS
SYSTOLIC BLOOD PRESSURE: 130 MMHG | HEART RATE: 57 BPM | TEMPERATURE: 99 F | RESPIRATION RATE: 18 BRPM | DIASTOLIC BLOOD PRESSURE: 66 MMHG | OXYGEN SATURATION: 100 %

## 2024-03-29 LAB
BASOPHILS # BLD AUTO: 0.04 K/UL — SIGNIFICANT CHANGE UP (ref 0–0.2)
BASOPHILS NFR BLD AUTO: 0.2 % — SIGNIFICANT CHANGE UP (ref 0–2)
EOSINOPHIL # BLD AUTO: 0.04 K/UL — SIGNIFICANT CHANGE UP (ref 0–0.5)
EOSINOPHIL NFR BLD AUTO: 0.2 % — SIGNIFICANT CHANGE UP (ref 0–6)
GLUCOSE BLDC GLUCOMTR-MCNC: 106 MG/DL — HIGH (ref 70–99)
GLUCOSE BLDC GLUCOMTR-MCNC: 170 MG/DL — HIGH (ref 70–99)
HCT VFR BLD CALC: 39.5 % — SIGNIFICANT CHANGE UP (ref 34.5–45)
HGB BLD-MCNC: 13.7 G/DL — SIGNIFICANT CHANGE UP (ref 11.5–15.5)
IANC: 11.78 K/UL — HIGH (ref 1.8–7.4)
IMM GRANULOCYTES NFR BLD AUTO: 1.6 % — HIGH (ref 0–0.9)
LYMPHOCYTES # BLD AUTO: 20.5 % — SIGNIFICANT CHANGE UP (ref 13–44)
LYMPHOCYTES # BLD AUTO: 3.64 K/UL — HIGH (ref 1–3.3)
MCHC RBC-ENTMCNC: 29.8 PG — SIGNIFICANT CHANGE UP (ref 27–34)
MCHC RBC-ENTMCNC: 34.7 GM/DL — SIGNIFICANT CHANGE UP (ref 32–36)
MCV RBC AUTO: 86.1 FL — SIGNIFICANT CHANGE UP (ref 80–100)
MONOCYTES # BLD AUTO: 1.94 K/UL — HIGH (ref 0–0.9)
MONOCYTES NFR BLD AUTO: 10.9 % — SIGNIFICANT CHANGE UP (ref 2–14)
NEUTROPHILS # BLD AUTO: 11.78 K/UL — HIGH (ref 1.8–7.4)
NEUTROPHILS NFR BLD AUTO: 66.6 % — SIGNIFICANT CHANGE UP (ref 43–77)
NRBC # BLD: 0 /100 WBCS — SIGNIFICANT CHANGE UP (ref 0–0)
NRBC # FLD: 0 K/UL — SIGNIFICANT CHANGE UP (ref 0–0)
PLATELET # BLD AUTO: 485 K/UL — HIGH (ref 150–400)
RBC # BLD: 4.59 M/UL — SIGNIFICANT CHANGE UP (ref 3.8–5.2)
RBC # FLD: 13.8 % — SIGNIFICANT CHANGE UP (ref 10.3–14.5)
WBC # BLD: 17.72 K/UL — HIGH (ref 3.8–10.5)
WBC # FLD AUTO: 17.72 K/UL — HIGH (ref 3.8–10.5)

## 2024-03-29 RX ADMIN — ENOXAPARIN SODIUM 40 MILLIGRAM(S): 100 INJECTION SUBCUTANEOUS at 13:56

## 2024-03-29 RX ADMIN — AMPICILLIN SODIUM AND SULBACTAM SODIUM 200 GRAM(S): 250; 125 INJECTION, POWDER, FOR SUSPENSION INTRAMUSCULAR; INTRAVENOUS at 12:19

## 2024-03-29 RX ADMIN — AMLODIPINE BESYLATE 7.5 MILLIGRAM(S): 2.5 TABLET ORAL at 06:17

## 2024-03-29 RX ADMIN — CHLORHEXIDINE GLUCONATE 15 MILLILITER(S): 213 SOLUTION TOPICAL at 06:18

## 2024-03-29 RX ADMIN — AMPICILLIN SODIUM AND SULBACTAM SODIUM 200 GRAM(S): 250; 125 INJECTION, POWDER, FOR SUSPENSION INTRAMUSCULAR; INTRAVENOUS at 06:17

## 2024-03-29 RX ADMIN — Medication 2: at 08:46

## 2024-03-29 RX ADMIN — Medication 600 MILLIGRAM(S): at 12:19

## 2024-03-29 RX ADMIN — BENZOCAINE AND MENTHOL 1 LOZENGE: 5; 1 LIQUID ORAL at 06:18

## 2024-03-29 RX ADMIN — BENZOCAINE AND MENTHOL 1 LOZENGE: 5; 1 LIQUID ORAL at 12:19

## 2024-03-29 RX ADMIN — Medication 600 MILLIGRAM(S): at 06:16

## 2024-03-29 RX ADMIN — Medication 600 MILLIGRAM(S): at 13:00

## 2024-03-29 NOTE — PROGRESS NOTE ADULT - ASSESSMENT
63F w/ PMH of HTN, DM, presents to [facility] ED w/ 1 day history of R sided facial swelling w/ dx of obstructive sialadenitis, now POD1 s/p bedside removal of sialolith, progressing well with soft floor of mouth.    Plan:  -D/C home later today    -F/u outpatient at [facility] in 1 week. Please call [phone] w/ any questions or concerns.   -Aggressive hydration   -Trend WBC        Juanis Loza  Oral and Maxillofacial Surgery  [facility] Pager [phone]  Available on Microsoft Teams

## 2024-03-29 NOTE — PROGRESS NOTE ADULT - SUBJECTIVE AND OBJECTIVE BOX
HPI:  63F w/ PMH of HTN, DM, presents to [facility] ED w/ 1 day history of R sided facial swelling. Pt reports that swelling has worsened significantly in last 12 hours. Pt went to dentist who told pt to come to ED for evaluation. Pt w/ significant difficulty breathing and swallowing. Pt is also reporting dental pain associated w/ teeth 30 and 31. WBC on arrival was 24, scoped by ENT who noted significant inflammation and edema of arytenoids and recommended emergent intubation for airway protection. [place] [facility] [place], on Unasyn, decadron, pain meds for symptomatic improvement.    INTERVAL EVENTS:  - NAEON, aVSS  - bedside removal of sialolith    ICU Vital Signs Last 24 Hrs  T(C): 36.9 (29 Mar 2024 02:05), Max: 36.9 (29 Mar 2024 02:05)  T(F): 98.4 (29 Mar 2024 02:05), Max: 98.4 (29 Mar 2024 02:05)  HR: 65 (29 Mar 2024 02:05) (63 - 75)  BP: 143/80 (29 Mar 2024 02:05) (129/70 - 144/73)  BP(mean): --  ABP: --  ABP(mean): --  RR: 18 (29 Mar 2024 02:05) (17 - 19)  SpO2: 99% (29 Mar 2024 02:05) (96% - 100%)    O2 Parameters below as of 29 Mar 2024 02:05  Patient On (Oxygen Delivery Method): room air    I&O's Detail    27 Mar 2024 07:01  -  28 Mar 2024 07:00  --------------------------------------------------------  IN:    IV PiggyBack: 200 mL    Lactated Ringers: 810 mL    Oral Fluid: 960 mL  Total IN: 1970 mL    OUT:  Total OUT: 0 mL    Total NET: 1970 mL      28 Mar 2024 07:01  -  29 Mar 2024 06:37  --------------------------------------------------------  IN:    Lactated Ringers Bolus: 1000 mL    Oral Fluid: 900 mL  Total IN: 1900 mL    OUT:  Total OUT: 0 mL    Total NET: 1900 mL      MEDICATIONS  (STANDING):  acetaminophen   Oral Liquid .. 650 milliGRAM(s) Oral every 6 hours  amLODIPine   Tablet 7.5 milliGRAM(s) Oral daily  ampicillin/sulbactam  IVPB      ampicillin/sulbactam  IVPB 3 Gram(s) IV Intermittent every 6 hours  benzocaine/menthol Lozenge 1 Lozenge Oral every 6 hours  chlorhexidine 0.12% Liquid 15 milliLiter(s) Swish and Spit two times a day  dextrose 5%. 1000 milliLiter(s) (100 mL/Hr) IV Continuous <Continuous>  dextrose 5%. 1000 milliLiter(s) (50 mL/Hr) IV Continuous <Continuous>  dextrose 50% Injectable 25 Gram(s) IV Push once  dextrose 50% Injectable 25 Gram(s) IV Push once  dextrose 50% Injectable 12.5 Gram(s) IV Push once  enoxaparin Injectable 40 milliGRAM(s) SubCutaneous every 24 hours  glucagon  Injectable 1 milliGRAM(s) IntraMuscular once  ibuprofen  Suspension. 600 milliGRAM(s) Oral every 6 hours  insulin lispro (ADMELOG) corrective regimen sliding scale   SubCutaneous three times a day before meals    MEDICATIONS  (PRN):  dextrose Oral Gel 15 Gram(s) Oral once PRN Blood Glucose LESS THAN 70 milliGRAM(s)/deciliter  metoclopramide Injectable 10 milliGRAM(s) IV Push once PRN only zofran ineffective  ondansetron Injectable 4 milliGRAM(s) IV Push every 8 hours PRN Nausea and/or Vomiting  oxyCODONE    Solution 5 milliGRAM(s) Oral every 4 hours PRN Moderate Pain (4 - 6)  oxyCODONE    Solution 10 milliGRAM(s) Oral every 4 hours PRN Severe Pain (7 - 10)    PHYSICAL EXAM:  Gen: AAox3, NAD  Face: R submandibular/neck swelling and erythema. Swelling is firm and tender to palpation. No trismus or lymphadenopathy. Inferior border of mandible palpable b/l.   Oral: Carious teeth #30-32. Pain to percussion on #31. Dysphagia and odynophagia. Minimal elevation of FOM                           13.1   23.81 )-----------( 476      ( 28 Mar 2024 06:11 )             38.2   03-28    137  |  101  |  19  ----------------------------<  235<H>  3.3<L>   |  24  |  0.76    Ca    9.5      28 Mar 2024 06:11  Phos  2.5     03-28  Mg     2.30     03-28      Culture - Abscess with Gram Stain (collected 27 Mar 2024 00:20)  Source: .Abscess mouth  Gram Stain (27 Mar 2024 13:50):    Moderate polymorphonuclear leukocytes per low power field    Few Gram Positive Cocci in Clusters per oil power field    Few Gram positive cocci in pairs per oil power field    Rare Gram Negative Rods per oil power field  Preliminary Report (28 Mar 2024 14:17):    Few Streptococcus anginosus "Susceptibilities not performed"    Culture - Blood (collected 26 Mar 2024 14:43)  Source: .Blood Blood-Peripheral  Preliminary Report (28 Mar 2024 19:03):    No growth at 48 Hours    Culture - Blood (collected 26 Mar 2024 14:43)  Source: .Blood Blood-Peripheral  Preliminary Report (28 Mar 2024 19:03):    No growth at 48 Hours

## 2024-03-31 LAB
CULTURE RESULTS: SIGNIFICANT CHANGE UP
CULTURE RESULTS: SIGNIFICANT CHANGE UP
SPECIMEN SOURCE: SIGNIFICANT CHANGE UP
SPECIMEN SOURCE: SIGNIFICANT CHANGE UP

## 2024-04-01 LAB
CULTURE RESULTS: ABNORMAL
SPECIMEN SOURCE: SIGNIFICANT CHANGE UP

## 2024-04-03 PROBLEM — M06.9 RHEUMATOID ARTHRITIS, UNSPECIFIED: Chronic | Status: ACTIVE | Noted: 2024-03-26

## 2024-04-03 PROBLEM — I10 ESSENTIAL (PRIMARY) HYPERTENSION: Chronic | Status: ACTIVE | Noted: 2024-03-26

## 2024-04-03 PROBLEM — E11.9 TYPE 2 DIABETES MELLITUS WITHOUT COMPLICATIONS: Chronic | Status: ACTIVE | Noted: 2024-03-26

## 2024-04-08 ENCOUNTER — APPOINTMENT (OUTPATIENT)
Age: 64
End: 2024-04-08
Payer: COMMERCIAL

## 2024-04-08 PROCEDURE — 99213 OFFICE O/P EST LOW 20 MIN: CPT

## 2024-04-16 PROBLEM — Z00.00 ENCOUNTER FOR PREVENTIVE HEALTH EXAMINATION: Status: ACTIVE | Noted: 2024-04-16
